# Patient Record
Sex: FEMALE | Race: WHITE | Employment: FULL TIME | URBAN - METROPOLITAN AREA
[De-identification: names, ages, dates, MRNs, and addresses within clinical notes are randomized per-mention and may not be internally consistent; named-entity substitution may affect disease eponyms.]

---

## 2018-10-27 LAB — LDL-C, EXTERNAL: 82

## 2019-06-21 ENCOUNTER — OFFICE VISIT (OUTPATIENT)
Dept: CARDIOLOGY CLINIC | Age: 64
End: 2019-06-21

## 2019-06-21 VITALS
WEIGHT: 134 LBS | DIASTOLIC BLOOD PRESSURE: 75 MMHG | HEART RATE: 87 BPM | HEIGHT: 64 IN | SYSTOLIC BLOOD PRESSURE: 140 MMHG | BODY MASS INDEX: 22.88 KG/M2

## 2019-06-21 DIAGNOSIS — R10.9 ABDOMINAL PAIN, UNSPECIFIED ABDOMINAL LOCATION: ICD-10-CM

## 2019-06-21 DIAGNOSIS — K92.1 HEMATOCHEZIA: ICD-10-CM

## 2019-06-21 DIAGNOSIS — I10 HTN (HYPERTENSION), BENIGN: ICD-10-CM

## 2019-06-21 DIAGNOSIS — E78.5 HYPERLIPIDEMIA, UNSPECIFIED HYPERLIPIDEMIA TYPE: ICD-10-CM

## 2019-06-21 DIAGNOSIS — I45.10 RBBB: Primary | ICD-10-CM

## 2019-06-21 DIAGNOSIS — Z82.49 FAMILY HISTORY OF CORONARY ARTERIOSCLEROSIS: ICD-10-CM

## 2019-06-21 RX ORDER — LISINOPRIL 40 MG/1
40 TABLET ORAL DAILY
COMMUNITY

## 2019-06-21 RX ORDER — HYDROCHLOROTHIAZIDE 25 MG/1
25 TABLET ORAL DAILY
COMMUNITY

## 2019-06-21 RX ORDER — SIMVASTATIN 20 MG/1
TABLET, FILM COATED ORAL
COMMUNITY
End: 2022-01-25

## 2019-06-21 NOTE — PROGRESS NOTES
HISTORY OF PRESENT ILLNESS  Ad Lozano is a 66 y.o. female. Patient is seen today for new patient evaluation. She had episode where she had profuse sudden sweating and felt like she had to go to the bathroom. She went to the bathroom but did not have any bowel movements. Slowly then episode resolved. No episode of syncope. No dizziness. Denies any chest pain shortness of breath or orthopnea or PND. She also had subsequently blood in the stool that was thought to be related to a blood vessel that was burst.  Had seen GI for that evaluation. Currently asymptomatic. She had abdominal cramps that is slowly resolved. She has strong family history of coronary disease with multiple member having atherosclerosis and MI at young age in 45s. She has multiple risk factor including hypertension hyperlipidemia and smoking      Review of Systems   Constitutional: Negative for chills and fever. HENT: Negative for nosebleeds. Eyes: Negative for blurred vision and double vision. Respiratory: Negative for cough, hemoptysis, sputum production, shortness of breath and wheezing. Cardiovascular: Negative for chest pain, palpitations, orthopnea, claudication, leg swelling and PND. Gastrointestinal: Positive for blood in stool. Negative for abdominal pain, heartburn, nausea and vomiting. Musculoskeletal: Negative for myalgias. Skin: Negative for rash. Neurological: Negative for dizziness, weakness and headaches. Endo/Heme/Allergies: Does not bruise/bleed easily.      Family History   Problem Relation Age of Onset    Heart Attack Father 61    Heart Surgery Brother     Coronary Artery Disease Brother 50    Heart Attack Mother        Past Medical History:   Diagnosis Date    Essential hypertension     Hyperlipidemia        Past Surgical History:   Procedure Laterality Date    HX PARTIAL HYSTERECTOMY         Social History     Tobacco Use    Smoking status: Current Every Day Smoker     Packs/day: 1.00     Types: Cigarettes    Smokeless tobacco: Never Used   Substance Use Topics    Alcohol use: Yes       Not on File    Prior to Admission medications    Medication Sig Start Date End Date Taking? Authorizing Provider   simvastatin (ZOCOR) 20 mg tablet Take  by mouth nightly. Yes Provider, Historical   lisinopril (PRINIVIL, ZESTRIL) 40 mg tablet Take 40 mg by mouth daily. Yes Provider, Historical   hydroCHLOROthiazide (HYDRODIURIL) 25 mg tablet Take 25 mg by mouth daily. Yes Provider, Historical         Visit Vitals  /75   Pulse 87   Ht 5' 4\" (1.626 m)   Wt 60.8 kg (134 lb)   BMI 23.00 kg/m²         Physical Exam   Constitutional: She is oriented to person, place, and time. She appears well-developed and well-nourished. HENT:   Head: Normocephalic and atraumatic. Eyes: Conjunctivae are normal.   Neck: Neck supple. No JVD present. No tracheal deviation present. No thyromegaly present. Cardiovascular: Normal rate and regular rhythm. PMI is not displaced. Exam reveals no gallop, no S3 and no decreased pulses. No murmur heard. Pulmonary/Chest: No respiratory distress. She has no wheezes. She has no rales. She exhibits no tenderness. Abdominal: Soft. There is no tenderness. Musculoskeletal: She exhibits no edema. Neurological: She is alert and oriented to person, place, and time. Skin: Skin is warm. Psychiatric: She has a normal mood and affect. Ms. Shabbir Anderson has a reminder for a \"due or due soon\" health maintenance. I have asked that she contact her primary care provider for follow-up on this health maintenance. No flowsheet data found. Assessment         ICD-10-CM ICD-9-CM    1. RBBB I45.10 426.4 ECHO ADULT COMPLETE      CT HEART W/O CONT WITH CALCIUM      NUCLEAR CARDIAC STRESS TEST    New onset. Unclear etiology rule out atherosclerotic disease in view of strong multiple risk factors   2. Hematochezia K92.1 578.1     Resolved. Followed by GI   3.  Abdominal pain, unspecified abdominal location R10.9 789.00     Improved. Likely noncardiac   4. HTN (hypertension), benign I10 401.1 ECHO ADULT COMPLETE      CT HEART W/O CONT WITH CALCIUM      NUCLEAR CARDIAC STRESS TEST    Stable continue treatment   5. Hyperlipidemia, unspecified hyperlipidemia type E78.5 272.4     Continue statin lab with PCP   6. Family history of coronary arteriosclerosis Z82.49 V17.3 ECHO ADULT COMPLETE      CT HEART W/O CONT WITH CALCIUM      NUCLEAR CARDIAC STRESS TEST    Multiple family members with coronary disease and young age   6/2019  Evaluation for right bundle branch block and symptoms that could be angina equivalent. New right bundle branch block. Will proceed with cardiac work-up including calcium score evaluation in view of strong family history    There are no discontinued medications. Orders Placed This Encounter    CT HEART W/O CONT WITH CALCIUM     Standing Status:   Future     Standing Expiration Date:   7/20/2020     Order Specific Question:   Is Patient Allergic to Contrast Dye? Answer:   No       Follow-up and Dispositions    · Return for F/u after tests.

## 2019-06-21 NOTE — PATIENT INSTRUCTIONS
i'mma Activation Thank you for requesting access to i'mma. Please follow the instructions below to securely access and download your online medical record. i'mma allows you to send messages to your doctor, view your test results, renew your prescriptions, schedule appointments, and more. How Do I Sign Up? 1. In your internet browser, go to [email or web address]. 2. Click on the First Time User? Click Here link in the Sign In box. You will see the New Member Sign Up page. 3. Enter your i'mma Access Code exactly as it appears below. You will not need to use this code after youve completed the sign-up process. If you do not sign up before the expiration date, you must request a new code. i'mma Access Code: [email or web address] Expires: 2019 12:52 PM (This is the date your i'mma access code will ) 4. Enter the last four digits of your Social Security Number (xxxx) and Date of Birth (mm/dd/yyyy) as indicated and click Submit. You will be taken to the next sign-up page. 5. Create a i'mma ID. This will be your i'mma login ID and cannot be changed, so think of one that is secure and easy to remember. 6. Create a i'mma password. You can change your password at any time. 7. Enter your Password Reset Question and Answer. This can be used at a later time if you forget your password. 8. Enter your e-mail address. You will receive e-mail notification when new information is available in [address] 9. Click Sign Up. You can now view and download portions of your medical record. 10. Click the Download Summary menu link to download a portable copy of your medical information. Additional Information If you have questions, please visit the Frequently Asked Questions section of the i'mma website at [email or web address] Remember, i'mma is NOT to be used for urgent needs. For medical emergencies, dial 911. Patient is scheduled to have a calcium scoring test at [facility] on 7/1/19 @ 1:30.

## 2019-07-01 ENCOUNTER — HOSPITAL ENCOUNTER (OUTPATIENT)
Dept: CT IMAGING | Age: 64
Discharge: HOME | End: 2019-07-01
Attending: INTERNAL MEDICINE
Payer: SELF-PAY

## 2019-07-01 DIAGNOSIS — I10 HTN (HYPERTENSION), BENIGN: ICD-10-CM

## 2019-07-01 DIAGNOSIS — Z82.49 FAMILY HISTORY OF CORONARY ARTERIOSCLEROSIS: ICD-10-CM

## 2019-07-01 DIAGNOSIS — I45.10 RBBB: ICD-10-CM

## 2019-07-01 PROCEDURE — 75571 CT HRT W/O DYE W/CA TEST: CPT

## 2019-07-30 ENCOUNTER — OFFICE VISIT (OUTPATIENT)
Dept: CARDIOLOGY CLINIC | Age: 64
End: 2019-07-30

## 2019-07-30 VITALS
HEART RATE: 71 BPM | BODY MASS INDEX: 23.39 KG/M2 | DIASTOLIC BLOOD PRESSURE: 65 MMHG | WEIGHT: 137 LBS | HEIGHT: 64 IN | SYSTOLIC BLOOD PRESSURE: 129 MMHG

## 2019-07-30 DIAGNOSIS — Z82.49 FAMILY HISTORY OF CORONARY ARTERIOSCLEROSIS: ICD-10-CM

## 2019-07-30 DIAGNOSIS — I10 HTN (HYPERTENSION), BENIGN: ICD-10-CM

## 2019-07-30 DIAGNOSIS — I25.84 CORONARY ARTERY CALCIFICATION: Primary | ICD-10-CM

## 2019-07-30 DIAGNOSIS — I25.10 CORONARY ARTERY CALCIFICATION: Primary | ICD-10-CM

## 2019-07-30 DIAGNOSIS — I45.10 RBBB: ICD-10-CM

## 2019-07-30 RX ORDER — ASPIRIN 81 MG/1
TABLET ORAL DAILY
COMMUNITY

## 2019-07-30 NOTE — PATIENT INSTRUCTIONS
AltspaceVR Activation    Thank you for requesting access to AltspaceVR. Please follow the instructions below to securely access and download your online medical record. AltspaceVR allows you to send messages to your doctor, view your test results, renew your prescriptions, schedule appointments, and more. How Do I Sign Up? 1. In your internet browser, go to [email or web address]. 2. Click on the First Time User? Click Here link in the Sign In box. You will see the New Member Sign Up page. 3. Enter your AltspaceVR Access Code exactly as it appears below. You will not need to use this code after youve completed the sign-up process. If you do not sign up before the expiration date, you must request a new code. AltspaceVR Access Code: [email or web address]  Expires: 2019 12:52 PM (This is the date your AltspaceVR access code will )    4. Enter the last four digits of your Social Security Number (xxxx) and Date of Birth (mm/dd/yyyy) as indicated and click Submit. You will be taken to the next sign-up page. 5. Create a AltspaceVR ID. This will be your AltspaceVR login ID and cannot be changed, so think of one that is secure and easy to remember. 6. Create a AltspaceVR password. You can change your password at any time. 7. Enter your Password Reset Question and Answer. This can be used at a later time if you forget your password. 8. Enter your e-mail address. You will receive e-mail notification when new information is available in [address] 9. Click Sign Up. You can now view and download portions of your medical record. 10. Click the Download Summary menu link to download a portable copy of your medical information. Additional Information    If you have questions, please visit the Frequently Asked Questions section of the AltspaceVR website at [email or web address] Remember, AltspaceVR is NOT to be used for urgent needs. For medical emergencies, dial 911.

## 2019-07-30 NOTE — PROGRESS NOTES
HISTORY OF PRESENT ILLNESS  Ruddy Quevedo is a 66 y.o. female. Patient is seen today for new patient evaluation. She had episode where she had profuse sudden sweating and felt like she had to go to the bathroom. She went to the bathroom but did not have any bowel movements. Slowly then episode resolved. No episode of syncope. No dizziness. Denies any chest pain shortness of breath or orthopnea or PND. She also had subsequently blood in the stool that was thought to be related to a blood vessel that was burst.  Had seen GI for that evaluation. Currently asymptomatic. She had abdominal cramps that is slowly resolved. She has strong family history of coronary disease with multiple member having atherosclerosis and MI at young age in 45s. She has multiple risk factor including hypertension hyperlipidemia and smoking with  Patient is here for follow up of diagnostic tests. Results will be discussed. Review of Systems   Constitutional: Negative for chills and fever. HENT: Negative for nosebleeds. Eyes: Negative for blurred vision and double vision. Respiratory: Negative for cough, hemoptysis, sputum production, shortness of breath and wheezing. Cardiovascular: Negative for chest pain, palpitations, orthopnea, claudication, leg swelling and PND. Gastrointestinal: Negative for abdominal pain, blood in stool, heartburn, nausea and vomiting. Musculoskeletal: Negative for myalgias. Skin: Negative for rash. Neurological: Negative for dizziness, weakness and headaches. Endo/Heme/Allergies: Does not bruise/bleed easily.      Family History   Problem Relation Age of Onset    Heart Attack Father 61    Heart Surgery Brother     Coronary Artery Disease Brother 50    Heart Attack Mother        Past Medical History:   Diagnosis Date    Essential hypertension     Hyperlipidemia        Past Surgical History:   Procedure Laterality Date    HX PARTIAL HYSTERECTOMY         Social History Tobacco Use    Smoking status: Current Every Day Smoker     Packs/day: 1.00     Types: Cigarettes    Smokeless tobacco: Never Used   Substance Use Topics    Alcohol use: Yes       Not on File    Prior to Admission medications    Medication Sig Start Date End Date Taking? Authorizing Provider   aspirin delayed-release 81 mg tablet Take  by mouth daily. Yes Provider, Historical   simvastatin (ZOCOR) 20 mg tablet Take  by mouth nightly. Yes Provider, Historical   lisinopril (PRINIVIL, ZESTRIL) 40 mg tablet Take 40 mg by mouth daily. Yes Provider, Historical   hydroCHLOROthiazide (HYDRODIURIL) 25 mg tablet Take 25 mg by mouth daily. Yes Provider, Historical         Visit Vitals  /65   Pulse 71   Ht 5' 4\" (1.626 m)   Wt 62.1 kg (137 lb)   BMI 23.52 kg/m²         Physical Exam   Constitutional: She is oriented to person, place, and time. She appears well-developed and well-nourished. HENT:   Head: Normocephalic and atraumatic. Eyes: Conjunctivae are normal.   Neck: Neck supple. No JVD present. No tracheal deviation present. No thyromegaly present. Cardiovascular: Normal rate and regular rhythm. PMI is not displaced. Exam reveals no gallop, no S3 and no decreased pulses. No murmur heard. Pulmonary/Chest: No respiratory distress. She has no wheezes. She has no rales. She exhibits no tenderness. Abdominal: Soft. There is no tenderness. Musculoskeletal: She exhibits no edema. Neurological: She is alert and oriented to person, place, and time. Skin: Skin is warm. Psychiatric: She has a normal mood and affect. Ms. Dimple Persaud has a reminder for a \"due or due soon\" health maintenance. I have asked that she contact her primary care provider for follow-up on this health maintenance. Procedure Conclusion 6/2019    Nuclear Stress Test     Negative myocardial perfusion imaging. Myocardial perfusion imaging supports a low risk stress test.   There is no prior study available for comparison. Interpretation Summary 6/2019       · Left Ventricle: Estimated left ventricular ejection fraction is 66 - 70%. No regional wall motion abnormality noted. Mild (grade 1) left ventricular diastolic dysfunction. · Right Ventricle: Normal right ventricular size and function. · No hemodynamically significant valvular pathology. CARDIOLOGY REPORT:       The patient underwent a limited noncontrast CT scan of the chest with cardiac  gating to evaluate for coronary arterial calcification. Using the Agatston  method the coronary calcium score was calculated. There is moderate coronary  calcium present in lad and rca. Coronary calcium score calculated at   329,lad-110,rca-219.     Impression:  Coronary calcium score 329. Assessment         ICD-10-CM ICD-9-CM    1. Coronary artery calcification I25.10 414.00     I25.84 414.4     Coronary calcification. Moderate. We will continue preventive therapy with aspirin and statin. Negative stress test   2. RBBB I45.10 426.4     Normal ejection fraction negative stress test monitor clinically   3. HTN (hypertension), benign I10 401.1     Stable   4. Family history of coronary arteriosclerosis Z82.49 V17.3     Continue risk factor modification   6/2019  Evaluation for right bundle branch block and symptoms that could be angina equivalent. New right bundle branch block. Will proceed with cardiac work-up including calcium score evaluation in view of strong family history  7/2019  Coronary calcification with negative stress test medical management. There are no discontinued medications. No orders of the defined types were placed in this encounter. Follow-up and Dispositions    · Return in about 6 months (around 1/30/2020).

## 2020-01-16 ENCOUNTER — OFFICE VISIT (OUTPATIENT)
Dept: CARDIOLOGY CLINIC | Age: 65
End: 2020-01-16

## 2020-01-16 VITALS
DIASTOLIC BLOOD PRESSURE: 77 MMHG | BODY MASS INDEX: 24.24 KG/M2 | SYSTOLIC BLOOD PRESSURE: 141 MMHG | HEART RATE: 94 BPM | HEIGHT: 64 IN | WEIGHT: 142 LBS

## 2020-01-16 DIAGNOSIS — I25.84 CORONARY ARTERY CALCIFICATION: Primary | ICD-10-CM

## 2020-01-16 DIAGNOSIS — E78.5 HYPERLIPIDEMIA, UNSPECIFIED HYPERLIPIDEMIA TYPE: ICD-10-CM

## 2020-01-16 DIAGNOSIS — I45.10 RBBB: ICD-10-CM

## 2020-01-16 DIAGNOSIS — I25.10 CORONARY ARTERY CALCIFICATION: Primary | ICD-10-CM

## 2020-01-16 DIAGNOSIS — I10 HTN (HYPERTENSION), BENIGN: ICD-10-CM

## 2020-01-16 RX ORDER — MULTIVIT WITH MINERALS/HERBS
1 TABLET ORAL DAILY
COMMUNITY
End: 2020-07-13

## 2020-01-16 RX ORDER — ASCORBIC ACID 250 MG
TABLET ORAL
COMMUNITY

## 2020-01-16 NOTE — PROGRESS NOTES
1. Have you been to the ER, urgent care clinic since your last visit? Hospitalized since your last visit?     no    2. Have you seen or consulted any other health care providers outside of the [address] [place] [address] since your last visit? Include any pap smears or colon screening. Yes Where: pcp     3. Since your last visit, have you had any of the following symptoms? chest pains.

## 2020-01-16 NOTE — PATIENT INSTRUCTIONS
Hitch Activation    Thank you for requesting access to Hitch. Please follow the instructions below to securely access and download your online medical record. Hitch allows you to send messages to your doctor, view your test results, renew your prescriptions, schedule appointments, and more. How Do I Sign Up? 1. In your internet browser, go to [email or web address]. 2. Click on the First Time User? Click Here link in the Sign In box. You will see the New Member Sign Up page. 3. Enter your Hitch Access Code exactly as it appears below. You will not need to use this code after youve completed the sign-up process. If you do not sign up before the expiration date, you must request a new code. Hitch Access Code: [email or web address]  Expires: 3/1/2020  8:52 AM (This is the date your Hitch access code will )    4. Enter the last four digits of your Social Security Number (xxxx) and Date of Birth (mm/dd/yyyy) as indicated and click Submit. You will be taken to the next sign-up page. 5. Create a Hitch ID. This will be your Hitch login ID and cannot be changed, so think of one that is secure and easy to remember. 6. Create a Hitch password. You can change your password at any time. 7. Enter your Password Reset Question and Answer. This can be used at a later time if you forget your password. 8. Enter your e-mail address. You will receive e-mail notification when new information is available in [address] 9. Click Sign Up. You can now view and download portions of your medical record. 10. Click the Download Summary menu link to download a portable copy of your medical information. Additional Information    If you have questions, please visit the Frequently Asked Questions section of the Hitch website at [email or web address] Remember, Hitch is NOT to be used for urgent needs. For medical emergencies, dial 911.

## 2020-01-16 NOTE — PROGRESS NOTES
HISTORY OF PRESENT ILLNESS  Tabitha Luque is a 60 y.o. female. Chest tightness6/ 2019 patient is seen today for new patient evaluation. She had episode where she had profuse sudden sweating and felt like she had to go to the bathroom. She went to the bathroom but did not have any bowel movements. Slowly then episode resolved. No episode of syncope. No dizziness. Denies any chest pain shortness of breath or orthopnea or PND. She also had subsequently blood in the stool that was thought to be related to a blood vessel that was burst.  Had seen GI for that evaluation. Currently asymptomatic. She had abdominal cramps that is slowly resolved. She has strong family history of coronary disease with multiple member having atherosclerosis and MI at young age in 45s. She has multiple risk factor including hypertension hyperlipidemia and smoking with  1/2020  Patient is here for follow-up of coronary calcification, hypertension and hyperlipidemia. On follow up patient denies any sob, palpitation or other significant symptoms. She has occasional chest tightness when doing strenuous activity      Review of Systems   Constitutional: Negative for chills and fever. HENT: Negative for nosebleeds. Eyes: Negative for blurred vision and double vision. Respiratory: Negative for cough, hemoptysis, sputum production, shortness of breath and wheezing. Cardiovascular: Negative for chest pain, palpitations, orthopnea, claudication, leg swelling and PND. Gastrointestinal: Negative for abdominal pain, blood in stool, heartburn, nausea and vomiting. Musculoskeletal: Negative for myalgias. Skin: Negative for rash. Neurological: Negative for dizziness, weakness and headaches. Endo/Heme/Allergies: Does not bruise/bleed easily.      Family History   Problem Relation Age of Onset    Heart Attack Father 61    Heart Surgery Brother     Coronary Artery Disease Brother 50    Heart Attack Mother        Past Medical History:   Diagnosis Date    Essential hypertension     Hyperlipidemia        Past Surgical History:   Procedure Laterality Date    HX PARTIAL HYSTERECTOMY         Social History     Tobacco Use    Smoking status: Current Every Day Smoker     Packs/day: 1.00     Types: Cigarettes    Smokeless tobacco: Never Used   Substance Use Topics    Alcohol use: Yes       No Known Allergies    Prior to Admission medications    Medication Sig Start Date End Date Taking? Authorizing Provider   b complex vitamins (VITAMINS B COMPLEX) tablet Take 1 Tab by mouth daily. Yes Provider, Historical   ascorbic acid, vitamin C, (VITAMIN C) 250 mg tablet Take  by mouth. Yes Provider, Historical   aspirin delayed-release 81 mg tablet Take  by mouth daily. Yes Provider, Historical   simvastatin (ZOCOR) 20 mg tablet Take  by mouth nightly. Yes Provider, Historical   lisinopril (PRINIVIL, ZESTRIL) 40 mg tablet Take 40 mg by mouth daily. Yes Provider, Historical   hydroCHLOROthiazide (HYDRODIURIL) 25 mg tablet Take 25 mg by mouth daily. Yes Provider, Historical         Visit Vitals  /77   Pulse 94   Ht 5' 4\" (1.626 m)   Wt 64.4 kg (142 lb)   BMI 24.37 kg/m²         Physical Exam   Constitutional: She is oriented to person, place, and time. She appears well-developed and well-nourished. HENT:   Head: Normocephalic and atraumatic. Eyes: Conjunctivae are normal.   Neck: Neck supple. No JVD present. No tracheal deviation present. No thyromegaly present. Cardiovascular: Normal rate and regular rhythm. PMI is not displaced. Exam reveals no gallop, no S3 and no decreased pulses. No murmur heard. Pulmonary/Chest: No respiratory distress. She has no wheezes. She has no rales. She exhibits no tenderness. Abdominal: Soft. There is no tenderness. Musculoskeletal:         General: No edema. Neurological: She is alert and oriented to person, place, and time. Skin: Skin is warm.    Psychiatric: She has a normal mood and affect. Ms. Lincoln Alanis has a reminder for a \"due or due soon\" health maintenance. I have asked that she contact her primary care provider for follow-up on this health maintenance. Procedure Conclusion 6/2019    Nuclear Stress Test     Negative myocardial perfusion imaging. Myocardial perfusion imaging supports a low risk stress test.   There is no prior study available for comparison. Interpretation Summary 6/2019       · Left Ventricle: Estimated left ventricular ejection fraction is 66 - 70%. No regional wall motion abnormality noted. Mild (grade 1) left ventricular diastolic dysfunction. · Right Ventricle: Normal right ventricular size and function. · No hemodynamically significant valvular pathology. CARDIOLOGY REPORT:       The patient underwent a limited noncontrast CT scan of the chest with cardiac  gating to evaluate for coronary arterial calcification. Using the Agatston  method the coronary calcium score was calculated. There is moderate coronary  calcium present in lad and rca. Coronary calcium score calculated at   329,lad-110,rca-219.     Impression:  Coronary calcium score 329. I Have personally reviewed recent relevant labs available and discussed with patient  11/2019-lipids  Assessment         ICD-10-CM ICD-9-CM    1. Coronary artery calcification I25.10 414.00     I25.84 414.4     Stable continue current medical management. Aspirin and statin   2. RBBB I45.10 426.4     Mostly asymptomatic monitor   3. HTN (hypertension), benign I10 401.1     Stable monitor   4. Hyperlipidemia, unspecified hyperlipidemia type E78.5 272.4     Continue treatment. Labs reviewed from 11/2019 LDL in 70s   6/2019  Evaluation for right bundle branch block and symptoms that could be angina equivalent. New right bundle branch block.   Will proceed with cardiac work-up including calcium score evaluation in view of strong family history  7/2019  Coronary calcification with negative stress test medical management. There are no discontinued medications. No orders of the defined types were placed in this encounter. Follow-up and Dispositions    · Return in about 6 months (around 7/16/2020).

## 2020-07-13 ENCOUNTER — OFFICE VISIT (OUTPATIENT)
Dept: CARDIOLOGY CLINIC | Age: 65
End: 2020-07-13

## 2020-07-13 VITALS
HEART RATE: 90 BPM | WEIGHT: 146 LBS | SYSTOLIC BLOOD PRESSURE: 129 MMHG | DIASTOLIC BLOOD PRESSURE: 74 MMHG | HEIGHT: 64 IN | TEMPERATURE: 97.7 F | BODY MASS INDEX: 24.92 KG/M2

## 2020-07-13 DIAGNOSIS — I25.84 CORONARY ARTERY CALCIFICATION: Primary | ICD-10-CM

## 2020-07-13 DIAGNOSIS — E78.5 HYPERLIPIDEMIA, UNSPECIFIED HYPERLIPIDEMIA TYPE: ICD-10-CM

## 2020-07-13 DIAGNOSIS — I10 HTN (HYPERTENSION), BENIGN: ICD-10-CM

## 2020-07-13 DIAGNOSIS — I45.10 RBBB: ICD-10-CM

## 2020-07-13 DIAGNOSIS — I25.10 CORONARY ARTERY CALCIFICATION: Primary | ICD-10-CM

## 2020-07-13 NOTE — PATIENT INSTRUCTIONS
[employer] Activation Thank you for requesting access to [employer]. Please follow the instructions below to securely access and download your online medical record. [employer] allows you to send messages to your doctor, view your test results, renew your prescriptions, schedule appointments, and more. How Do I Sign Up? 1. In your internet browser, go to [email or web address]. 2. Click on the First Time User? Click Here link in the Sign In box. You will see the New Member Sign Up page. 3. Enter your [employer] Access Code exactly as it appears below. You will not need to use this code after youve completed the sign-up process. If you do not sign up before the expiration date, you must request a new code. [employer] Access Code: [email or web address] Expires: 2020  9:18 AM (This is the date your [employer] access code will ) 4. Enter the last four digits of your Social Security Number (xxxx) and Date of Birth (mm/dd/yyyy) as indicated and click Submit. You will be taken to the next sign-up page. 5. Create a [employer] ID. This will be your [employer] login ID and cannot be changed, so think of one that is secure and easy to remember. 6. Create a [employer] password. You can change your password at any time. 7. Enter your Password Reset Question and Answer. This can be used at a later time if you forget your password. 8. Enter your e-mail address. You will receive e-mail notification when new information is available in [address] 9. Click Sign Up. You can now view and download portions of your medical record. 10. Click the Download Summary menu link to download a portable copy of your medical information. Additional Information If you have questions, please visit the Frequently Asked Questions section of the [employer] website at [email or web address] Remember, [employer] is NOT to be used for urgent needs. For medical emergencies, dial 911.

## 2020-07-13 NOTE — PROGRESS NOTES
1. Have you been to the ER, urgent care clinic since your last visit? Hospitalized since your last visit?no    2. Have you seen or consulted any other health care providers outside of the [address] [place] [address] since your last visit? Include any pap smears or colon screening.  no

## 2020-07-13 NOTE — PROGRESS NOTES
HISTORY OF PRESENT ILLNESS  Kaylynn Downs is a 60 y.o. female. 6/ 2019  Chest tightness patient is seen today for new patient evaluation. She had episode where she had profuse sudden sweating and felt like she had to go to the bathroom. She went to the bathroom but did not have any bowel movements. Slowly then episode resolved. No episode of syncope. No dizziness. Denies any chest pain shortness of breath or orthopnea or PND. She also had subsequently blood in the stool that was thought to be related to a blood vessel that was burst.  Had seen GI for that evaluation. Currently asymptomatic. She had abdominal cramps that is slowly resolved. She has strong family history of coronary disease with multiple member having atherosclerosis and MI at young age in 45s. She has multiple risk factor including hypertension hyperlipidemia and smoking with  1/2020  Patient is here for follow-up of coronary calcification, hypertension and hyperlipidemia. On follow up patient denies any sob, palpitation or other significant symptoms. She has occasional chest tightness when doing strenuous activity      Review of Systems   Constitutional: Negative for chills and fever. HENT: Negative for nosebleeds. Eyes: Negative for blurred vision and double vision. Respiratory: Negative for cough, hemoptysis, sputum production, shortness of breath and wheezing. Cardiovascular: Negative for chest pain, palpitations, orthopnea, claudication, leg swelling and PND. Gastrointestinal: Negative for abdominal pain, blood in stool, heartburn, nausea and vomiting. Musculoskeletal: Negative for myalgias. Skin: Negative for rash. Neurological: Negative for dizziness, weakness and headaches. Endo/Heme/Allergies: Does not bruise/bleed easily.      Family History   Problem Relation Age of Onset    Heart Attack Father 61    Heart Surgery Brother     Coronary Artery Disease Brother 50    Heart Attack Mother        Past Medical History:   Diagnosis Date    Essential hypertension     Hyperlipidemia        Past Surgical History:   Procedure Laterality Date    HX PARTIAL HYSTERECTOMY         Social History     Tobacco Use    Smoking status: Current Every Day Smoker     Packs/day: 1.00     Types: Cigarettes    Smokeless tobacco: Never Used    Tobacco comment: pack a day   Substance Use Topics    Alcohol use: Yes     Comment: 7  beers a week       No Known Allergies    Prior to Admission medications    Medication Sig Start Date End Date Taking? Authorizing Provider   ascorbic acid, vitamin C, (VITAMIN C) 250 mg tablet Take  by mouth. Yes Provider, Historical   aspirin delayed-release 81 mg tablet Take  by mouth daily. Yes Provider, Historical   simvastatin (ZOCOR) 20 mg tablet Take  by mouth nightly. Yes Provider, Historical   lisinopril (PRINIVIL, ZESTRIL) 40 mg tablet Take 40 mg by mouth daily. Yes Provider, Historical   hydroCHLOROthiazide (HYDRODIURIL) 25 mg tablet Take 25 mg by mouth daily. Yes Provider, Historical         Visit Vitals  /74   Pulse 90   Temp 97.7 °F (36.5 °C)   Ht 5' 4\" (1.626 m)   Wt 66.2 kg (146 lb)   BMI 25.06 kg/m²         Physical Exam   Constitutional: She is oriented to person, place, and time. She appears well-developed and well-nourished. HENT:   Head: Normocephalic and atraumatic. Eyes: Conjunctivae are normal.   Neck: Neck supple. No JVD present. No tracheal deviation present. No thyromegaly present. Cardiovascular: Normal rate and regular rhythm. PMI is not displaced. Exam reveals no gallop, no S3 and no decreased pulses. No murmur heard. Pulmonary/Chest: No respiratory distress. She has no wheezes. She has no rales. She exhibits no tenderness. Abdominal: Soft. There is no abdominal tenderness. Musculoskeletal:         General: No edema. Neurological: She is alert and oriented to person, place, and time. Skin: Skin is warm.    Psychiatric: She has a normal mood and affect. Ms. Jensen Cruz has a reminder for a \"due or due soon\" health maintenance. I have asked that she contact her primary care provider for follow-up on this health maintenance. Procedure Conclusion 6/2019    Nuclear Stress Test     Negative myocardial perfusion imaging. Myocardial perfusion imaging supports a low risk stress test.   There is no prior study available for comparison. Interpretation Summary 6/2019       · Left Ventricle: Estimated left ventricular ejection fraction is 66 - 70%. No regional wall motion abnormality noted. Mild (grade 1) left ventricular diastolic dysfunction. · Right Ventricle: Normal right ventricular size and function. · No hemodynamically significant valvular pathology. CARDIOLOGY REPORT:       The patient underwent a limited noncontrast CT scan of the chest with cardiac  gating to evaluate for coronary arterial calcification. Using the Agatston  method the coronary calcium score was calculated. There is moderate coronary  calcium present in lad and rca. Coronary calcium score calculated at   329,lad-110,rca-219.     Impression:  Coronary calcium score 329. I Have personally reviewed recent relevant labs available and discussed with patient  11/2019lipids  Assessment         ICD-10-CM ICD-9-CM    1. Coronary artery calcification  I25.10 414.00     I25.84 414.4     Stable continue current medical management   2. RBBB  I45.10 426.4     Old monitor   3. HTN (hypertension), benign  I10 401.1     Stable continue treatment   4. Hyperlipidemia, unspecified hyperlipidemia type  E78.5 272.4     Continue statin last lab reviewed 11/2019.   6/2019  Evaluation for right bundle branch block and symptoms that could be angina equivalent. New right bundle branch block. Will proceed with cardiac work-up including calcium score evaluation in view of strong family history  7/2019  Coronary calcification with negative stress test medical management.   7/2020  Cardiac status stable. Continue statin and aspirin. Lab with PCP  Medications Discontinued During This Encounter   Medication Reason    b complex vitamins (VITAMINS B COMPLEX) tablet Not A Current Medication       No orders of the defined types were placed in this encounter. Follow-up and Dispositions    · Return in about 6 months (around 1/13/2021).

## 2021-01-15 ENCOUNTER — OFFICE VISIT (OUTPATIENT)
Dept: CARDIOLOGY CLINIC | Age: 66
End: 2021-01-15
Payer: COMMERCIAL

## 2021-01-15 VITALS
WEIGHT: 152 LBS | DIASTOLIC BLOOD PRESSURE: 77 MMHG | HEART RATE: 90 BPM | TEMPERATURE: 97.4 F | SYSTOLIC BLOOD PRESSURE: 157 MMHG | BODY MASS INDEX: 25.95 KG/M2 | HEIGHT: 64 IN

## 2021-01-15 DIAGNOSIS — I25.10 CORONARY ARTERY CALCIFICATION: Primary | ICD-10-CM

## 2021-01-15 DIAGNOSIS — I45.10 RBBB: ICD-10-CM

## 2021-01-15 DIAGNOSIS — I25.84 CORONARY ARTERY CALCIFICATION: Primary | ICD-10-CM

## 2021-01-15 DIAGNOSIS — I10 HTN (HYPERTENSION), BENIGN: ICD-10-CM

## 2021-01-15 DIAGNOSIS — E78.5 HYPERLIPIDEMIA, UNSPECIFIED HYPERLIPIDEMIA TYPE: ICD-10-CM

## 2021-01-15 PROCEDURE — 99214 OFFICE O/P EST MOD 30 MIN: CPT | Performed by: INTERNAL MEDICINE

## 2021-01-15 NOTE — PROGRESS NOTES
HISTORY OF PRESENT ILLNESS  Antony Greene is a 63 y.o. female. 6/ 2019  Chest tightness patient is seen today for new patient evaluation. She had episode where she had profuse sudden sweating and felt like she had to go to the bathroom. She went to the bathroom but did not have any bowel movements. Slowly then episode resolved. No episode of syncope. No dizziness. Denies any chest pain shortness of breath or orthopnea or PND. She also had subsequently blood in the stool that was thought to be related to a blood vessel that was burst.  Had seen GI for that evaluation. Currently asymptomatic. She had abdominal cramps that is slowly resolved. She has strong family history of coronary disease with multiple member having atherosclerosis and MI at young age in 45s. She has multiple risk factor including hypertension hyperlipidemia and smoking with  1/2020  Patient is here for follow-up of coronary calcification, hypertension and hyperlipidemia. On follow up patient denies any sob, palpitation or other significant symptoms. She has occasional chest tightness when doing strenuous activity  1/2021  Patient seen today for follow-up. Patient has occasional chest tightness lasting few seconds not related activity exertion. No shortness of breath or edema. Review of Systems   Constitutional: Negative for chills and fever. HENT: Negative for nosebleeds. Eyes: Negative for blurred vision and double vision. Respiratory: Negative for cough, hemoptysis, sputum production, shortness of breath and wheezing. Cardiovascular: Negative for chest pain, palpitations, orthopnea, claudication, leg swelling and PND. Gastrointestinal: Negative for abdominal pain, blood in stool, heartburn, nausea and vomiting. Musculoskeletal: Negative for myalgias. Skin: Negative for rash. Neurological: Negative for dizziness, weakness and headaches. Endo/Heme/Allergies: Does not bruise/bleed easily.      Family History   Problem Relation Age of Onset    Heart Attack Father 61    Heart Surgery Brother     Coronary Artery Disease Brother 50    Heart Attack Mother        Past Medical History:   Diagnosis Date    Essential hypertension     Hyperlipidemia        Past Surgical History:   Procedure Laterality Date    HX PARTIAL HYSTERECTOMY         Social History     Tobacco Use    Smoking status: Current Every Day Smoker     Packs/day: 1.00     Types: Cigarettes    Smokeless tobacco: Never Used    Tobacco comment: pack a day   Substance Use Topics    Alcohol use: Yes     Comment: 7  beers a week       No Known Allergies    Prior to Admission medications    Medication Sig Start Date End Date Taking? Authorizing Provider   aspirin delayed-release 81 mg tablet Take  by mouth daily. Yes Provider, Historical   simvastatin (ZOCOR) 20 mg tablet Take  by mouth nightly. Yes Provider, Historical   lisinopril (PRINIVIL, ZESTRIL) 40 mg tablet Take 40 mg by mouth daily. Yes Provider, Historical   hydroCHLOROthiazide (HYDRODIURIL) 25 mg tablet Take 25 mg by mouth daily. Yes Provider, Historical   ascorbic acid, vitamin C, (VITAMIN C) 250 mg tablet Take  by mouth. Provider, Historical         Visit Vitals  BP (!) 157/77   Pulse 90   Temp 97.4 °F (36.3 °C) (Temporal)   Ht 5' 4\" (1.626 m)   Wt 68.9 kg (152 lb)   BMI 26.09 kg/m²         Physical Exam   Constitutional: She is oriented to person, place, and time. She appears well-developed and well-nourished. HENT:   Head: Normocephalic and atraumatic. Eyes: Conjunctivae are normal.   Neck: Neck supple. No JVD present. No tracheal deviation present. No thyromegaly present. Cardiovascular: Normal rate and regular rhythm. PMI is not displaced. Exam reveals no gallop, no S3 and no decreased pulses. No murmur heard. Pulmonary/Chest: No respiratory distress. She has no wheezes. She has no rales. She exhibits no tenderness. Abdominal: Soft.  There is no abdominal tenderness. Musculoskeletal:         General: No edema. Neurological: She is alert and oriented to person, place, and time. Skin: Skin is warm. Psychiatric: She has a normal mood and affect. Ms. Calderon [place] has a reminder for a \"due or due soon\" health maintenance. I have asked that she contact her primary care provider for follow-up on this health maintenance. Procedure Conclusion 6/2019    Nuclear Stress Test     Negative myocardial perfusion imaging. Myocardial perfusion imaging supports a low risk stress test.   There is no prior study available for comparison. Interpretation Summary 6/2019       · Left Ventricle: Estimated left ventricular ejection fraction is 66 - 70%. No regional wall motion abnormality noted. Mild (grade 1) left ventricular diastolic dysfunction. · Right Ventricle: Normal right ventricular size and function. · No hemodynamically significant valvular pathology. CARDIOLOGY REPORT:       The patient underwent a limited noncontrast CT scan of the chest with cardiac  gating to evaluate for coronary arterial calcification. Using the Agatston  method the coronary calcium score was calculated. There is moderate coronary  calcium present in lad and rca. Coronary calcium score calculated at   329,lad-110,rca-219.     Impression:  Coronary calcium score 329. I Have personally reviewed recent relevant labs available and discussed with patient  11/2019lipids  Assessment         ICD-10-CM ICD-9-CM    1. Coronary artery calcification  I25.10 414.00     I25.84 414.4     Stable continue treatment monitor asymptomatic   2. RBBB  I45.10 426.4     Old. Stable   3. HTN (hypertension), benign  I10 401.1     Blood pressure is elevated in office usually runs normal will monitor at home and decide on treatment adjustment   4. Hyperlipidemia, unspecified hyperlipidemia type  E78.5 272.4     Continue current statin.   Lab with PCP   6/2019  Evaluation for right bundle branch block and symptoms that could be angina equivalent. New right bundle branch block. Will proceed with cardiac work-up including calcium score evaluation in view of strong family history  7/2019  Coronary calcification with negative stress test medical management. 7/2020  Cardiac status stable. Continue statin and aspirin. Lab with PCP  1/2021  Cardiac status stable. Chest tightness stable. Continue medical management. Lab with PCP pending. Elevated blood pressure here will monitor at home and decide on treatment adjustment  There are no discontinued medications. No orders of the defined types were placed in this encounter. Follow-up and Dispositions    · Return in about 6 months (around 7/15/2021) for get labs from pcp.

## 2021-01-15 NOTE — PROGRESS NOTES
1. Have you been to the ER, urgent care clinic since your last visit? Hospitalized since your last visit?     no  2. Have you seen or consulted any other health care providers outside of the [address] [place] [address] since your last visit? Include any pap smears or colon screening. Yes Where:      3. Since your last visit, have you had any of the following symptoms? chest pains and swelling in legs/arms.

## 2021-07-06 ENCOUNTER — OFFICE VISIT (OUTPATIENT)
Dept: CARDIOLOGY CLINIC | Age: 66
End: 2021-07-06
Payer: COMMERCIAL

## 2021-07-06 VITALS
WEIGHT: 150 LBS | HEART RATE: 85 BPM | BODY MASS INDEX: 25.61 KG/M2 | DIASTOLIC BLOOD PRESSURE: 71 MMHG | SYSTOLIC BLOOD PRESSURE: 123 MMHG | HEIGHT: 64 IN

## 2021-07-06 DIAGNOSIS — I45.10 RBBB: ICD-10-CM

## 2021-07-06 DIAGNOSIS — I25.84 CORONARY ARTERY CALCIFICATION: Primary | ICD-10-CM

## 2021-07-06 DIAGNOSIS — E78.5 HYPERLIPIDEMIA, UNSPECIFIED HYPERLIPIDEMIA TYPE: ICD-10-CM

## 2021-07-06 DIAGNOSIS — I25.10 CORONARY ARTERY CALCIFICATION: Primary | ICD-10-CM

## 2021-07-06 DIAGNOSIS — Z82.49 FAMILY HISTORY OF CORONARY ARTERIOSCLEROSIS: ICD-10-CM

## 2021-07-06 DIAGNOSIS — I10 HTN (HYPERTENSION), BENIGN: ICD-10-CM

## 2021-07-06 PROCEDURE — 99214 OFFICE O/P EST MOD 30 MIN: CPT | Performed by: INTERNAL MEDICINE

## 2021-07-06 NOTE — PATIENT INSTRUCTIONS
Right Bundle Branch Block         Heart-Healthy Diet: Care Instructions  Your Care Instructions     A heart-healthy diet has lots of vegetables, fruits, nuts, beans, and whole grains, and is low in salt. It limits foods that are high in saturated fat, such as meats, cheeses, and fried foods. It may be hard to change your diet, but even small changes can lower your risk of heart attack and heart disease. Follow-up care is a key part of your treatment and safety. Be sure to make and go to all appointments, and call your doctor if you are having problems. It's also a good idea to know your test results and keep a list of the medicines you take. How can you care for yourself at home? Watch your portions  · Use food labels to learn what the recommended servings are for the foods you eat. · Eat only the number of calories you need to stay at a healthy weight. If you need to lose weight, eat fewer calories than your body burns (through exercise and other physical activity). Eat more fruits and vegetables  · Eat a variety of fruit and vegetables every day. Dark green, deep orange, red, or yellow fruits and vegetables are especially good for you. Examples include spinach, carrots, peaches, and berries. · Keep carrots, celery, and other veggies handy for snacks. Buy fruit that is in season and store it where you can see it so that you will be tempted to eat it. · Cook dishes that have a lot of veggies in them, such as stir-fries and soups. Limit saturated fat  · Read food labels, and try to avoid saturated fats. They increase your risk of heart disease. · Use olive or canola oil when you cook. · Bake, broil, grill, or steam foods instead of frying them. · Choose lean meats instead of high-fat meats such as hot dogs and sausages. Cut off all visible fat when you prepare meat. · Eat fish, skinless poultry, and meat alternatives such as soy products instead of high-fat meats.  Soy products, such as tofu, may be especially good for your heart. · Choose low-fat or fat-free milk and dairy products. Eat foods high in fiber  · Eat a variety of grain products every day. Include whole-grain foods that have lots of fiber and nutrients. Examples of whole-grain foods include oats, whole wheat bread, and brown rice. · Buy whole-grain breads and cereals, instead of white bread or pastries. Limit salt and sodium  · Limit how much salt and sodium you eat to help lower your blood pressure. · Taste food before you salt it. Add only a little salt when you think you need it. With time, your taste buds will adjust to less salt. · Eat fewer snack items, fast foods, and other high-salt, processed foods. Check food labels for the amount of sodium in packaged foods. · Choose low-sodium versions of canned goods (such as soups, vegetables, and beans). Limit sugar  · Limit drinks and foods with added sugar. These include candy, desserts, and soda pop. Limit alcohol  · Limit alcohol to no more than 2 drinks a day for men and 1 drink a day for women. Too much alcohol can cause health problems. When should you call for help? Watch closely for changes in your health, and be sure to contact your doctor if:    · You would like help planning heart-healthy meals. Where can you learn more? Go to http://www.smith.com/  Enter V137 in the search box to learn more about \"Heart-Healthy Diet: Care Instructions. \"  Current as of: December 17, 2020               Content Version: 12.8  © 2006-2021 [employer]. Care instructions adapted under license by [employer] (which disclaims liability or warranty for this information). If you have questions about a medical condition or this instruction, always ask your healthcare professional. [place] [address] any warranty or liability for your use of this information.

## 2021-07-06 NOTE — PROGRESS NOTES
HISTORY OF PRESENT ILLNESS  Manda Castrejon is a 63 y.o. female. 6/ 2019  Chest tightness patient is seen today for new patient evaluation. She had episode where she had profuse sudden sweating and felt like she had to go to the bathroom. She went to the bathroom but did not have any bowel movements. Slowly then episode resolved. No episode of syncope. No dizziness. Denies any chest pain shortness of breath or orthopnea or PND. She also had subsequently blood in the stool that was thought to be related to a blood vessel that was burst.  Had seen GI for that evaluation. Currently asymptomatic. She had abdominal cramps that is slowly resolved. She has strong family history of coronary disease with multiple member having atherosclerosis and MI at young age in 45s. She has multiple risk factor including hypertension hyperlipidemia and smoking with  1/2020  Patient is here for follow-up of coronary calcification, hypertension and hyperlipidemia. On follow up patient denies any sob, palpitation or other significant symptoms. She has occasional chest tightness when doing strenuous activity  1/2021  Patient seen today for follow-up. Patient has occasional chest tightness lasting few seconds not related activity exertion. No shortness of breath or edema. 7/2021  Patent seen for 6 month f/u for history of RBBB. She denies chest pain, SOB, palpitations or edema. Review of Systems   Constitutional: Negative for chills and fever. HENT: Negative for nosebleeds. Eyes: Negative for blurred vision and double vision. Respiratory: Negative for cough, hemoptysis, sputum production, shortness of breath and wheezing. Cardiovascular: Negative for chest pain, palpitations, orthopnea, claudication, leg swelling and PND. Gastrointestinal: Negative for abdominal pain, blood in stool, heartburn, nausea and vomiting. Musculoskeletal: Negative for myalgias. Skin: Negative for rash.    Neurological: Negative for dizziness, weakness and headaches. Endo/Heme/Allergies: Does not bruise/bleed easily. Family History   Problem Relation Age of Onset    Heart Attack Father 61    Heart Surgery Brother     Coronary Artery Disease Brother 50    Heart Attack Mother        Past Medical History:   Diagnosis Date    Essential hypertension     Hyperlipidemia        Past Surgical History:   Procedure Laterality Date    HX PARTIAL HYSTERECTOMY         Social History     Tobacco Use    Smoking status: Current Every Day Smoker     Packs/day: 1.00     Types: Cigarettes    Smokeless tobacco: Never Used    Tobacco comment: pack a day   Substance Use Topics    Alcohol use: Yes     Comment: 7  beers a week       No Known Allergies    Prior to Admission medications    Medication Sig Start Date End Date Taking? Authorizing Provider   ascorbic acid, vitamin C, (VITAMIN C) 250 mg tablet Take  by mouth. Yes Provider, Historical   aspirin delayed-release 81 mg tablet Take  by mouth daily. Yes Provider, Historical   simvastatin (ZOCOR) 20 mg tablet Take  by mouth nightly. Yes Provider, Historical   lisinopril (PRINIVIL, ZESTRIL) 40 mg tablet Take 40 mg by mouth daily. Yes Provider, Historical   hydroCHLOROthiazide (HYDRODIURIL) 25 mg tablet Take 25 mg by mouth daily. Yes Provider, Historical         Visit Vitals  /71   Pulse 85   Ht 5' 4\" (1.626 m)   Wt 68 kg (150 lb)   BMI 25.75 kg/m²         Physical Exam  Vitals and nursing note reviewed. Constitutional:       Appearance: She is well-developed. HENT:      Head: Normocephalic and atraumatic. Eyes:      Conjunctiva/sclera: Conjunctivae normal.   Neck:      Thyroid: No thyromegaly. Vascular: No JVD. Trachea: No tracheal deviation. Cardiovascular:      Rate and Rhythm: Normal rate and regular rhythm. Chest Wall: PMI is not displaced. Pulses: No decreased pulses. Heart sounds: No murmur heard. No gallop. No S3 sounds. Pulmonary:      Effort: No respiratory distress. Breath sounds: No wheezing or rales. Chest:      Chest wall: No tenderness. Abdominal:      Palpations: Abdomen is soft. Tenderness: There is no abdominal tenderness. Musculoskeletal:      Cervical back: Neck supple. Right lower leg: No edema. Left lower leg: No edema. Skin:     General: Skin is warm. Neurological:      Mental Status: She is alert and oriented to person, place, and time. Ms. Laura Robins has a reminder for a \"due or due soon\" health maintenance. I have asked that she contact her primary care provider for follow-up on this health maintenance. Procedure Conclusion 6/2019    Nuclear Stress Test     Negative myocardial perfusion imaging. Myocardial perfusion imaging supports a low risk stress test.   There is no prior study available for comparison. Interpretation Summary 6/2019       · Left Ventricle: Estimated left ventricular ejection fraction is 66 - 70%. No regional wall motion abnormality noted. Mild (grade 1) left ventricular diastolic dysfunction. · Right Ventricle: Normal right ventricular size and function. · No hemodynamically significant valvular pathology. CARDIOLOGY REPORT:       The patient underwent a limited noncontrast CT scan of the chest with cardiac  gating to evaluate for coronary arterial calcification. Using the Agatston  method the coronary calcium score was calculated. There is moderate coronary  calcium present in lad and rca. Coronary calcium score calculated at   329,lad-110,rca-219.     Impression:  Coronary calcium score 329. I Have personally reviewed recent relevant labs available and discussed with patient  11/2019lipids  Assessment         ICD-10-CM ICD-9-CM    1. Coronary artery calcification  I25.10 414.00     I25.84 414.4     Stable continue treatment monitor asymptomatic   2. RBBB  I45.10 426.4      Old. Stable       3.  HTN (hypertension), benign  I10 401.1 Blood pressure is controlled, continue current treatment   4. Hyperlipidemia, unspecified hyperlipidemia type  E78.5 272.4     Continue statin lab with PCP   5. Family history of coronary arteriosclerosis  Z82.49 V17.3     Stable continue treatment monitor asymptomatic   6/2019  Evaluation for right bundle branch block and symptoms that could be angina equivalent. New right bundle branch block. Will proceed with cardiac work-up including calcium score evaluation in view of strong family history  7/2019  Coronary calcification with negative stress test medical management. 7/2020  Cardiac status stable. Continue statin and aspirin. Lab with PCP  1/2021  Cardiac status stable. Chest tightness stable. Continue medical management. Lab with PCP pending. Elevated blood pressure here will monitor at home and decide on treatment adjustment  7/2021  Cardiac status is stable. Recent BMP CBC and lipids reviewed and discussed with patient blood pressure is controlled continue current medications. I have personally performed a face-to-face diagnostic evaluation on this patient. My findings are as follows. History and physical exam by me shows: Blood pressure controlled. Clinical exam unremarkable  I have personally reviewed all the diagnostic labs, available EKG imaging x-rays and other diagnostic data and incorporated them into my care I discussed. .  My clinical impression for patient's status stable. Mildly elevated triglyceride. Continue with low-carb diet and current treatment  Detailed discussion of patient's care plan was discussed. All appropriate treatment options discussed and incorporated. All medications as well as diagnostic testing evaluation that are required for the patient I reviewed and orders are placed under my supervision. Care plan discussed and updated after review. Juve Denis MD    There are no discontinued medications.     No orders of the defined types were placed in this encounter. Follow-up and Dispositions    · Return in about 6 months (around 1/6/2022), or if symptoms worsen or fail to improve, for Follow up with Dr. Yolanda Rutledge.

## 2021-07-06 NOTE — PROGRESS NOTES
1. Have you been to the ER, urgent care clinic since your last visit? Hospitalized since your last visit?     no  2. Have you seen or consulted any other health care providers outside of the [address] [place] [address] since your last visit? Include any pap smears or colon screening. No     3. Since your last visit, have you had any of the following symptoms?      swelling in legs/arms.

## 2022-01-25 ENCOUNTER — OFFICE VISIT (OUTPATIENT)
Dept: CARDIOLOGY CLINIC | Age: 67
End: 2022-01-25
Payer: COMMERCIAL

## 2022-01-25 VITALS
BODY MASS INDEX: 25.78 KG/M2 | DIASTOLIC BLOOD PRESSURE: 67 MMHG | OXYGEN SATURATION: 98 % | HEIGHT: 64 IN | HEART RATE: 88 BPM | SYSTOLIC BLOOD PRESSURE: 132 MMHG | WEIGHT: 151 LBS

## 2022-01-25 DIAGNOSIS — I25.10 CORONARY ARTERY CALCIFICATION: Primary | ICD-10-CM

## 2022-01-25 DIAGNOSIS — I25.84 CORONARY ARTERY CALCIFICATION: Primary | ICD-10-CM

## 2022-01-25 DIAGNOSIS — I10 HTN (HYPERTENSION), BENIGN: ICD-10-CM

## 2022-01-25 DIAGNOSIS — I45.10 RBBB: ICD-10-CM

## 2022-01-25 DIAGNOSIS — E78.5 HYPERLIPIDEMIA, UNSPECIFIED HYPERLIPIDEMIA TYPE: ICD-10-CM

## 2022-01-25 PROCEDURE — 99214 OFFICE O/P EST MOD 30 MIN: CPT | Performed by: INTERNAL MEDICINE

## 2022-01-25 RX ORDER — FLUTICASONE PROPIONATE 50 MCG
SPRAY, SUSPENSION (ML) NASAL
COMMUNITY
Start: 2021-10-25

## 2022-01-25 RX ORDER — SIMVASTATIN 40 MG/1
40 TABLET, FILM COATED ORAL
Qty: 90 TABLET | Refills: 3 | Status: SHIPPED | OUTPATIENT
Start: 2022-01-25

## 2022-01-25 NOTE — PROGRESS NOTES
1. Have you been to the ER, urgent care clinic since your last visit? Hospitalized since your last visit? No    2. Have you seen or consulted any other health care providers outside of the [address] [place] [address] since your last visit? Include any pap smears or colon screening.  No

## 2022-01-25 NOTE — PROGRESS NOTES
HISTORY OF PRESENT ILLNESS  Valdemar Carter is a 61 y.o. female. 6/ 2019  Chest tightness patient is seen today for new patient evaluation. She had episode where she had profuse sudden sweating and felt like she had to go to the bathroom. She went to the bathroom but did not have any bowel movements. Slowly then episode resolved. No episode of syncope. No dizziness. Denies any chest pain shortness of breath or orthopnea or PND. She also had subsequently blood in the stool that was thought to be related to a blood vessel that was burst.  Had seen GI for that evaluation. Currently asymptomatic. She had abdominal cramps that is slowly resolved. She has strong family history of coronary disease with multiple member having atherosclerosis and MI at young age in 45s. She has multiple risk factor including hypertension hyperlipidemia and smoking with  1/2020  Patient is here for follow-up of coronary calcification, hypertension and hyperlipidemia. On follow up patient denies any sob, palpitation or other significant symptoms. She has occasional chest tightness when doing strenuous activity  1/2021  Patient seen today for follow-up. Patient has occasional chest tightness lasting few seconds not related activity exertion. No shortness of breath or edema. 7/2021  Patent seen for 6 month f/u for history of RBBB. She denies chest pain, SOB, palpitations or edema. 1/2022  On follow up patient denies any chest pains,sob, palpitation or other significant symptoms. Review of Systems   Constitutional: Negative for chills and fever. HENT: Negative for nosebleeds. Eyes: Negative for blurred vision and double vision. Respiratory: Negative for cough, hemoptysis, sputum production, shortness of breath and wheezing. Cardiovascular: Negative for chest pain, palpitations, orthopnea, claudication, leg swelling and PND.    Gastrointestinal: Negative for abdominal pain, blood in stool, heartburn, nausea and vomiting. Musculoskeletal: Negative for myalgias. Skin: Negative for rash. Neurological: Negative for dizziness, weakness and headaches. Endo/Heme/Allergies: Does not bruise/bleed easily. Family History   Problem Relation Age of Onset    Heart Attack Father 61    Heart Surgery Brother     Coronary Art Dis Brother 50    Heart Attack Mother        Past Medical History:   Diagnosis Date    Essential hypertension     Hyperlipidemia        Past Surgical History:   Procedure Laterality Date    HX PARTIAL HYSTERECTOMY         Social History     Tobacco Use    Smoking status: Current Every Day Smoker     Packs/day: 1.00     Types: Cigarettes    Smokeless tobacco: Never Used    Tobacco comment: pack a day   Substance Use Topics    Alcohol use: Yes     Comment: 7  beers a week       No Known Allergies    Prior to Admission medications    Medication Sig Start Date End Date Taking? Authorizing Provider   fluticasone propionate (FLONASE) 50 mcg/actuation nasal spray instill 2 sprays into each nostril daily for allergies 10/25/21  Yes Provider, Historical   ascorbic acid, vitamin C, (VITAMIN C) 250 mg tablet Take  by mouth. Yes Provider, Historical   aspirin delayed-release 81 mg tablet Take  by mouth daily. Yes Provider, Historical   simvastatin (ZOCOR) 20 mg tablet Take  by mouth nightly. Yes Provider, Historical   lisinopril (PRINIVIL, ZESTRIL) 40 mg tablet Take 40 mg by mouth daily. Yes Provider, Historical   hydroCHLOROthiazide (HYDRODIURIL) 25 mg tablet Take 25 mg by mouth daily. Yes Provider, Historical         Visit Vitals  /67 (BP 1 Location: Left upper arm, BP Patient Position: Sitting, BP Cuff Size: Adult)   Pulse 88   Ht 5' 4\" (1.626 m)   Wt 68.5 kg (151 lb)   SpO2 98%   BMI 25.92 kg/m²         Physical Exam  Vitals and nursing note reviewed. Constitutional:       Appearance: She is well-developed. HENT:      Head: Normocephalic and atraumatic.    Eyes: Conjunctiva/sclera: Conjunctivae normal.   Neck:      Thyroid: No thyromegaly. Vascular: No JVD. Trachea: No tracheal deviation. Cardiovascular:      Rate and Rhythm: Normal rate and regular rhythm. Chest Wall: PMI is not displaced. Pulses: No decreased pulses. Heart sounds: No murmur heard. No gallop. No S3 sounds. Pulmonary:      Effort: No respiratory distress. Breath sounds: No wheezing or rales. Chest:      Chest wall: No tenderness. Abdominal:      Palpations: Abdomen is soft. Tenderness: There is no abdominal tenderness. Musculoskeletal:      Cervical back: Neck supple. Right lower leg: No edema. Left lower leg: No edema. Skin:     General: Skin is warm. Neurological:      Mental Status: She is alert and oriented to person, place, and time. Ms. Patricia Mendosa has a reminder for a \"due or due soon\" health maintenance. I have asked that she contact her primary care provider for follow-up on this health maintenance. Procedure Conclusion 6/2019    Nuclear Stress Test     Negative myocardial perfusion imaging. Myocardial perfusion imaging supports a low risk stress test.   There is no prior study available for comparison. Interpretation Summary 6/2019       · Left Ventricle: Estimated left ventricular ejection fraction is 66 - 70%. No regional wall motion abnormality noted. Mild (grade 1) left ventricular diastolic dysfunction. · Right Ventricle: Normal right ventricular size and function. · No hemodynamically significant valvular pathology. CARDIOLOGY REPORT:       The patient underwent a limited noncontrast CT scan of the chest with cardiac  gating to evaluate for coronary arterial calcification. Using the Agatston  method the coronary calcium score was calculated. There is moderate coronary  calcium present in lad and rca. Coronary calcium score calculated at   329,lad-110,rca-219.     Impression:  Coronary calcium score 329.   I Have personally reviewed recent relevant labs available and discussed with patient  11/2019lipids  10/2021-lipids  Assessment         ICD-10-CM ICD-9-CM    1. Coronary artery calcification  I25.10 414.00     I25.84 414.4     Stable asymptomatic continue treatment   2. RBBB  I45.10 426.4     Old stable   3. HTN (hypertension), benign  I10 401.1     Stable continue treatment   4. Hyperlipidemia, unspecified hyperlipidemia type  E78.5 272.4     Continue treatment lab with PCP   6/2019  Evaluation for right bundle branch block and symptoms that could be angina equivalent. New right bundle branch block. Will proceed with cardiac work-up including calcium score evaluation in view of strong family history  7/2019  Coronary calcification with negative stress test medical management. 7/2020  Cardiac status stable. Continue statin and aspirin. Lab with PCP  1/2021  Cardiac status stable. Chest tightness stable. Continue medical management. Lab with PCP pending. Elevated blood pressure here will monitor at home and decide on treatment adjustment  7/2021  Cardiac status is stable. Recent BMP CBC and lipids reviewed and discussed with patient blood pressure is controlled continue current medications. 1/2022  Stable cardiac status continue current medical management monitor. Recent LDL 87 I will increase simvastatin to 40 mg  Deirdre Rutherford MD    There are no discontinued medications. No orders of the defined types were placed in this encounter. Follow-up and Dispositions    · Return in about 6 months (around 7/25/2022).

## 2022-03-18 PROBLEM — I25.10 CORONARY ARTERY CALCIFICATION: Status: ACTIVE | Noted: 2019-07-30

## 2022-03-18 PROBLEM — I25.84 CORONARY ARTERY CALCIFICATION: Status: ACTIVE | Noted: 2019-07-30

## 2022-08-24 ENCOUNTER — OFFICE VISIT (OUTPATIENT)
Dept: CARDIOLOGY CLINIC | Age: 67
End: 2022-08-24
Payer: COMMERCIAL

## 2022-08-24 VITALS
BODY MASS INDEX: 25.61 KG/M2 | HEART RATE: 72 BPM | SYSTOLIC BLOOD PRESSURE: 150 MMHG | DIASTOLIC BLOOD PRESSURE: 70 MMHG | WEIGHT: 150 LBS | HEIGHT: 64 IN

## 2022-08-24 DIAGNOSIS — I45.10 RBBB: ICD-10-CM

## 2022-08-24 DIAGNOSIS — E78.5 HYPERLIPIDEMIA, UNSPECIFIED HYPERLIPIDEMIA TYPE: ICD-10-CM

## 2022-08-24 DIAGNOSIS — I25.10 CORONARY ARTERY CALCIFICATION: Primary | ICD-10-CM

## 2022-08-24 DIAGNOSIS — I25.84 CORONARY ARTERY CALCIFICATION: Primary | ICD-10-CM

## 2022-08-24 DIAGNOSIS — I10 HTN (HYPERTENSION), BENIGN: ICD-10-CM

## 2022-08-24 PROCEDURE — 99214 OFFICE O/P EST MOD 30 MIN: CPT | Performed by: INTERNAL MEDICINE

## 2022-08-24 NOTE — PROGRESS NOTES
1. Have you been to the ER, urgent care clinic since your last visit? Hospitalized since your last visit? No    2. Have you seen or consulted any other health care providers outside of the [address] [place] [address] since your last visit? Include any pap smears or colon screening.  No

## 2022-08-24 NOTE — PROGRESS NOTES
HISTORY OF PRESENT ILLNESS  Sofy Waggoner is a 61 y.o. female. 6/ 2019  Chest tightness patient is seen today for new patient evaluation. She had episode where she had profuse sudden sweating and felt like she had to go to the bathroom. She went to the bathroom but did not have any bowel movements. Slowly then episode resolved. No episode of syncope. No dizziness. Denies any chest pain shortness of breath or orthopnea or PND. She also had subsequently blood in the stool that was thought to be related to a blood vessel that was burst.  Had seen GI for that evaluation. Currently asymptomatic. She had abdominal cramps that is slowly resolved. She has strong family history of coronary disease with multiple member having atherosclerosis and MI at young age in 45s. She has multiple risk factor including hypertension hyperlipidemia and smoking with  1/2020  Patient is here for follow-up of coronary calcification, hypertension and hyperlipidemia. On follow up patient denies any sob, palpitation or other significant symptoms. She has occasional chest tightness when doing strenuous activity  1/2021  Patient seen today for follow-up. Patient has occasional chest tightness lasting few seconds not related activity exertion. No shortness of breath or edema. 7/2021  Patent seen for 6 month f/u for history of RBBB. She denies chest pain, SOB, palpitations or edema. 1/2022  On follow up patient denies any chest pains,sob, palpitation or other significant symptoms. Review of Systems   Constitutional:  Negative for chills and fever. HENT:  Negative for nosebleeds. Eyes:  Negative for blurred vision and double vision. Respiratory:  Negative for cough, hemoptysis, sputum production, shortness of breath and wheezing. Cardiovascular:  Negative for chest pain, palpitations, orthopnea, claudication, leg swelling and PND.    Gastrointestinal:  Negative for abdominal pain, blood in stool, heartburn, nausea and vomiting. Musculoskeletal:  Negative for myalgias. Skin:  Negative for rash. Neurological:  Negative for dizziness, weakness and headaches. Endo/Heme/Allergies:  Does not bruise/bleed easily. Family History   Problem Relation Age of Onset    Heart Attack Father 61    Heart Surgery Brother     Coronary Art Dis Brother 50    Heart Attack Mother        Past Medical History:   Diagnosis Date    Essential hypertension     Hyperlipidemia        Past Surgical History:   Procedure Laterality Date    HX PARTIAL HYSTERECTOMY         Social History     Tobacco Use    Smoking status: Every Day     Packs/day: 1.00     Types: Cigarettes    Smokeless tobacco: Never    Tobacco comments:     pack a day   Substance Use Topics    Alcohol use: Yes     Comment: 7  beers a week       No Known Allergies    Prior to Admission medications    Medication Sig Start Date End Date Taking? Authorizing Provider   fluticasone propionate (FLONASE) 50 mcg/actuation nasal spray instill 2 sprays into each nostril daily for allergies 10/25/21  Yes Provider, Historical   simvastatin (ZOCOR) 40 mg tablet Take 1 Tablet by mouth nightly. 1/25/22  Yes Sis Wilson MD   ascorbic acid, vitamin C, (VITAMIN C) 250 mg tablet Take  by mouth. Yes Provider, Historical   aspirin delayed-release 81 mg tablet Take  by mouth daily. Yes Provider, Historical   lisinopril (PRINIVIL, ZESTRIL) 40 mg tablet Take 40 mg by mouth daily. Yes Provider, Historical   hydroCHLOROthiazide (HYDRODIURIL) 25 mg tablet Take 25 mg by mouth daily. Yes Provider, Historical         Visit Vitals  BP (!) 150/70 (BP 1 Location: Left upper arm, BP Patient Position: Sitting, BP Cuff Size: Small adult)   Pulse 72   Ht 5' 4\" (1.626 m)   Wt 68 kg (150 lb)   BMI 25.75 kg/m²         Physical Exam  Vitals and nursing note reviewed. Constitutional:       Appearance: She is well-developed. HENT:      Head: Normocephalic and atraumatic.    Eyes:      Conjunctiva/sclera: Conjunctivae normal.   Neck:      Thyroid: No thyromegaly. Vascular: No JVD. Trachea: No tracheal deviation. Cardiovascular:      Rate and Rhythm: Normal rate and regular rhythm. Chest Wall: PMI is not displaced. Pulses: No decreased pulses. Heart sounds: No murmur heard. No gallop. No S3 sounds. Pulmonary:      Effort: No respiratory distress. Breath sounds: No wheezing or rales. Chest:      Chest wall: No tenderness. Abdominal:      Palpations: Abdomen is soft. Tenderness: There is no abdominal tenderness. Musculoskeletal:      Cervical back: Neck supple. Right lower leg: No edema. Left lower leg: No edema. Skin:     General: Skin is warm. Neurological:      Mental Status: She is alert and oriented to person, place, and time. Ms. Bernice Sever has a reminder for a \"due or due soon\" health maintenance. I have asked that she contact her primary care provider for follow-up on this health maintenance. Procedure Conclusion 6/2019    Nuclear Stress Test     Negative myocardial perfusion imaging. Myocardial perfusion imaging supports a low risk stress test.   There is no prior study available for comparison. Interpretation Summary 6/2019       Left Ventricle: Estimated left ventricular ejection fraction is 66 - 70%. No regional wall motion abnormality noted. Mild (grade 1) left ventricular diastolic dysfunction. Right Ventricle: Normal right ventricular size and function. No hemodynamically significant valvular pathology. CARDIOLOGY REPORT:       The patient underwent a limited noncontrast CT scan of the chest with cardiac  gating to evaluate for coronary arterial calcification. Using the Agatston  method the coronary calcium score was calculated. There is moderate coronary  calcium present in lad and rca. Coronary calcium score calculated at   329,lad-110,rca-219. Impression:  Coronary calcium score 329.   I Have personally reviewed recent relevant labs available and discussed with patient  11/2019-lipids  10/2021-lipids  Assessment         ICD-10-CM ICD-9-CM    1. Coronary artery calcification  I25.10 414.00     I25.84 414.4       2. RBBB  I45.10 426.4       3. HTN (hypertension), benign  I10 401.1       4. Hyperlipidemia, unspecified hyperlipidemia type  E78.5 272.4       5. Family history of coronary arteriosclerosis  Z82.49 V17.3       6/2019  Evaluation for right bundle branch block and symptoms that could be angina equivalent. New right bundle branch block. Will proceed with cardiac work-up including calcium score evaluation in view of strong family history  7/2019  Coronary calcification with negative stress test medical management. 7/2020  Cardiac status stable. Continue statin and aspirin. Lab with PCP  1/2021  Cardiac status stable. Chest tightness stable. Continue medical management. Lab with PCP pending. Elevated blood pressure here will monitor at home and decide on treatment adjustment  7/2021  Cardiac status is stable. Recent BMP CBC and lipids reviewed and discussed with patient blood pressure is controlled continue current medications. 1/2022  Stable cardiac status continue current medical management monitor. Recent LDL 87 I will increase simvastatin to 40 mg  8/2022  Mildly elevated blood pressure. Continue monitoring and decide on medication adjustment if needed. Home monitoring. Recheck labs. Continue current dose of statin. Otherwise stable  Paco Maldonado MD    There are no discontinued medications. No orders of the defined types were placed in this encounter.

## 2022-10-05 LAB
A-G RATIO,AGRAT: 2.4 RATIO (ref 1.1–2.6)
ALBUMIN SERPL-MCNC: 4.7 G/DL (ref 3.5–5)
ALP SERPL-CCNC: 82 U/L (ref 40–120)
ALT SERPL-CCNC: 19 U/L (ref 5–40)
AST SERPL W P-5'-P-CCNC: 21 U/L (ref 10–37)
BILIRUB SERPL-MCNC: 0.6 MG/DL (ref 0.2–1.2)
BILIRUBIN, DIRECT,CBIL: <0.2 MG/DL (ref 0–0.3)
CHOLEST SERPL-MCNC: 151 MG/DL (ref 110–200)
GLOBULIN,GLOB: 2 G/DL (ref 2–4)
HDLC SERPL-MCNC: 3.4 MG/DL (ref 0–5)
HDLC SERPL-MCNC: 45 MG/DL
LDL/HDL RATIO,LDHD: 1.4
LDLC SERPL CALC-MCNC: 65 MG/DL (ref 50–99)
NON-HDL CHOLESTEROL, 011976: 106 MG/DL
PROT SERPL-MCNC: 6.7 G/DL (ref 6.2–8.1)
TRIGL SERPL-MCNC: 207 MG/DL (ref 40–149)
VLDLC SERPL CALC-MCNC: 41 MG/DL (ref 8–30)

## 2022-10-06 ENCOUNTER — TELEPHONE (OUTPATIENT)
Dept: CARDIOLOGY CLINIC | Age: 67
End: 2022-10-06

## 2022-10-06 NOTE — TELEPHONE ENCOUNTER
----- Message from Jonathan Gregg MD sent at 10/6/2022  7:11 AM EDT -----  Increase triglyceride continue with low-carb diet otherwise continue treatment

## 2022-10-06 NOTE — TELEPHONE ENCOUNTER
Called and left message regarding lab/test per Dr. Nicholas Gloria. Lab reviewed. Increase triglyceride continue with low-carb diet otherwise continue treatment. If any questions to call office.

## 2023-03-22 ENCOUNTER — OFFICE VISIT (OUTPATIENT)
Age: 68
End: 2023-03-22
Payer: MEDICARE

## 2023-03-22 VITALS
HEART RATE: 82 BPM | WEIGHT: 148 LBS | DIASTOLIC BLOOD PRESSURE: 71 MMHG | SYSTOLIC BLOOD PRESSURE: 146 MMHG | OXYGEN SATURATION: 95 % | HEIGHT: 64 IN | BODY MASS INDEX: 25.27 KG/M2

## 2023-03-22 DIAGNOSIS — E78.5 HYPERLIPIDEMIA, UNSPECIFIED HYPERLIPIDEMIA TYPE: ICD-10-CM

## 2023-03-22 DIAGNOSIS — I45.10 UNSPECIFIED RIGHT BUNDLE-BRANCH BLOCK: ICD-10-CM

## 2023-03-22 DIAGNOSIS — I10 ESSENTIAL (PRIMARY) HYPERTENSION: ICD-10-CM

## 2023-03-22 DIAGNOSIS — I25.10 ATHEROSCLEROSIS OF NATIVE CORONARY ARTERY OF NATIVE HEART WITHOUT ANGINA PECTORIS: Primary | ICD-10-CM

## 2023-03-22 PROCEDURE — 3077F SYST BP >= 140 MM HG: CPT | Performed by: INTERNAL MEDICINE

## 2023-03-22 PROCEDURE — G8484 FLU IMMUNIZE NO ADMIN: HCPCS | Performed by: INTERNAL MEDICINE

## 2023-03-22 PROCEDURE — 1090F PRES/ABSN URINE INCON ASSESS: CPT | Performed by: INTERNAL MEDICINE

## 2023-03-22 PROCEDURE — 99214 OFFICE O/P EST MOD 30 MIN: CPT | Performed by: INTERNAL MEDICINE

## 2023-03-22 PROCEDURE — G8417 CALC BMI ABV UP PARAM F/U: HCPCS | Performed by: INTERNAL MEDICINE

## 2023-03-22 PROCEDURE — 3017F COLORECTAL CA SCREEN DOC REV: CPT | Performed by: INTERNAL MEDICINE

## 2023-03-22 PROCEDURE — G8400 PT W/DXA NO RESULTS DOC: HCPCS | Performed by: INTERNAL MEDICINE

## 2023-03-22 PROCEDURE — 1123F ACP DISCUSS/DSCN MKR DOCD: CPT | Performed by: INTERNAL MEDICINE

## 2023-03-22 PROCEDURE — G8427 DOCREV CUR MEDS BY ELIG CLIN: HCPCS | Performed by: INTERNAL MEDICINE

## 2023-03-22 PROCEDURE — 3078F DIAST BP <80 MM HG: CPT | Performed by: INTERNAL MEDICINE

## 2023-03-22 PROCEDURE — 4004F PT TOBACCO SCREEN RCVD TLK: CPT | Performed by: INTERNAL MEDICINE

## 2023-03-22 ASSESSMENT — PATIENT HEALTH QUESTIONNAIRE - PHQ9
SUM OF ALL RESPONSES TO PHQ QUESTIONS 1-9: 0
SUM OF ALL RESPONSES TO PHQ QUESTIONS 1-9: 0
DEPRESSION UNABLE TO ASSESS: FUNCTIONAL CAPACITY MOTIVATION LIMITS ACCURACY
SUM OF ALL RESPONSES TO PHQ QUESTIONS 1-9: 0
SUM OF ALL RESPONSES TO PHQ QUESTIONS 1-9: 0
1. LITTLE INTEREST OR PLEASURE IN DOING THINGS: 0
SUM OF ALL RESPONSES TO PHQ9 QUESTIONS 1 & 2: 0
2. FEELING DOWN, DEPRESSED OR HOPELESS: 0

## 2023-03-22 NOTE — PROGRESS NOTES
1. Have you been to the ER, urgent care clinic since your last visit? Hospitalized since your last visit? No    2. Have you seen or consulted any other health care providers outside of the [address] [place] [address] since your last visit? Include any pap smears or colon screening.       No

## 2023-03-22 NOTE — PROGRESS NOTES
HISTORY OF PRESENT ILLNESS  Miranda Trejo is a 61 y.o. female. 6/ 2019  Chest tightness patient is seen today for new patient evaluation. She had episode where she had profuse sudden sweating and felt like she had to go to the bathroom. She went to the bathroom but did not have any bowel movements. Slowly then episode resolved. No episode of syncope. No dizziness. Denies any chest pain shortness of breath or orthopnea or PND. She also had subsequently blood in the stool that was thought to be related to a blood vessel that was burst.  Had seen GI for that evaluation. Currently asymptomatic. She had abdominal cramps that is slowly resolved. She has strong family history of coronary disease with multiple member having atherosclerosis and MI at young age in 45s. She has multiple risk factor including hypertension hyperlipidemia and smoking with  1/2020  Patient is here for follow-up of coronary calcification, hypertension and hyperlipidemia. On follow up patient denies any sob, palpitation or other significant symptoms. She has occasional chest tightness when doing strenuous activity  1/2021  Patient seen today for follow-up. Patient has occasional chest tightness lasting few seconds not related activity exertion. No shortness of breath or edema. 7/2021  Patent seen for 6 month f/u for history of RBBB. She denies chest pain, SOB, palpitations or edema. 1/2022  On follow up patient denies any chest pains,sob, palpitation or other significant symptoms. Review of Systems   Constitutional:  Negative for chills and fever. HENT:  Negative for nosebleeds. Eyes:  Negative for blurred vision and double vision. Respiratory:  Negative for cough, hemoptysis, sputum production, shortness of breath and wheezing. Cardiovascular:  Negative for chest pain, palpitations, orthopnea, claudication, leg swelling and PND.    Gastrointestinal:  Negative for abdominal pain, blood in stool, heartburn,

## 2023-10-03 ENCOUNTER — OFFICE VISIT (OUTPATIENT)
Age: 68
End: 2023-10-03
Payer: MEDICARE

## 2023-10-03 VITALS
DIASTOLIC BLOOD PRESSURE: 64 MMHG | WEIGHT: 140 LBS | SYSTOLIC BLOOD PRESSURE: 130 MMHG | HEIGHT: 64 IN | OXYGEN SATURATION: 96 % | BODY MASS INDEX: 23.9 KG/M2 | HEART RATE: 86 BPM

## 2023-10-03 DIAGNOSIS — E78.5 HYPERLIPIDEMIA, UNSPECIFIED HYPERLIPIDEMIA TYPE: ICD-10-CM

## 2023-10-03 DIAGNOSIS — I25.10 ATHEROSCLEROSIS OF NATIVE CORONARY ARTERY OF NATIVE HEART WITHOUT ANGINA PECTORIS: Primary | ICD-10-CM

## 2023-10-03 DIAGNOSIS — I10 ESSENTIAL (PRIMARY) HYPERTENSION: ICD-10-CM

## 2023-10-03 DIAGNOSIS — Z72.0 TOBACCO ABUSE: ICD-10-CM

## 2023-10-03 PROCEDURE — G8427 DOCREV CUR MEDS BY ELIG CLIN: HCPCS | Performed by: NURSE PRACTITIONER

## 2023-10-03 PROCEDURE — G8420 CALC BMI NORM PARAMETERS: HCPCS | Performed by: NURSE PRACTITIONER

## 2023-10-03 PROCEDURE — 3078F DIAST BP <80 MM HG: CPT | Performed by: NURSE PRACTITIONER

## 2023-10-03 PROCEDURE — 3075F SYST BP GE 130 - 139MM HG: CPT | Performed by: NURSE PRACTITIONER

## 2023-10-03 PROCEDURE — 3017F COLORECTAL CA SCREEN DOC REV: CPT | Performed by: NURSE PRACTITIONER

## 2023-10-03 PROCEDURE — G8400 PT W/DXA NO RESULTS DOC: HCPCS | Performed by: NURSE PRACTITIONER

## 2023-10-03 PROCEDURE — 1090F PRES/ABSN URINE INCON ASSESS: CPT | Performed by: NURSE PRACTITIONER

## 2023-10-03 PROCEDURE — 4004F PT TOBACCO SCREEN RCVD TLK: CPT | Performed by: NURSE PRACTITIONER

## 2023-10-03 PROCEDURE — G8484 FLU IMMUNIZE NO ADMIN: HCPCS | Performed by: NURSE PRACTITIONER

## 2023-10-03 PROCEDURE — 1123F ACP DISCUSS/DSCN MKR DOCD: CPT | Performed by: NURSE PRACTITIONER

## 2023-10-03 PROCEDURE — 99214 OFFICE O/P EST MOD 30 MIN: CPT | Performed by: NURSE PRACTITIONER

## 2023-10-03 ASSESSMENT — PATIENT HEALTH QUESTIONNAIRE - PHQ9
SUM OF ALL RESPONSES TO PHQ QUESTIONS 1-9: 0
1. LITTLE INTEREST OR PLEASURE IN DOING THINGS: 0
SUM OF ALL RESPONSES TO PHQ9 QUESTIONS 1 & 2: 0
DEPRESSION UNABLE TO ASSESS: FUNCTIONAL CAPACITY MOTIVATION LIMITS ACCURACY
2. FEELING DOWN, DEPRESSED OR HOPELESS: 0
SUM OF ALL RESPONSES TO PHQ QUESTIONS 1-9: 0

## 2023-10-03 NOTE — PROGRESS NOTES
1. Have you been to the ER, urgent care clinic since your last visit? Hospitalized since your last visit? No    2. Have you seen or consulted any other health care providers outside of the [address] [place] [address] since your last visit? Include any pap smears or colon screening.       No

## 2023-10-03 NOTE — PROGRESS NOTES
HISTORY OF PRESENT ILLNESS  Jj Mckeon is a 61 y.o. female. [birth year]/ 2017  Chest tightness patient is seen today for new patient evaluation. She had episode where she had profuse sudden sweating and felt like she had to go to the bathroom. She went to the bathroom but did not have any bowel movements. Slowly then episode resolved. No episode of syncope. No dizziness. Denies any chest pain shortness of breath or orthopnea or PND. She also had subsequently blood in the stool that was thought to be related to a blood vessel that was burst.  Had seen GI for that evaluation. Currently asymptomatic. She had abdominal cramps that is slowly resolved. She has strong family history of coronary disease with multiple member having atherosclerosis and MI at young age in 45s. She has multiple risk factor including hypertension hyperlipidemia and smoking with  1/2020  Patient is here for follow-up of coronary calcification, hypertension and hyperlipidemia. On follow up patient denies any sob, palpitation or other significant symptoms. She has occasional chest tightness when doing strenuous activity  1/2021  Patient seen today for follow-up. Patient has occasional chest tightness lasting few seconds not related activity exertion. No shortness of breath or edema. 7/2021  Patent seen for 6 month f/u for history of RBBB. She denies chest pain, SOB, palpitations or edema. 1/2022  On follow up patient denies any chest pains,sob, palpitation or other significant symptoms. Review of Systems   Constitutional:  Negative for chills and fever. HENT:  Negative for nosebleeds. Eyes:  Negative for blurred vision and double vision. Respiratory:  Negative for cough, hemoptysis, sputum production, shortness of breath and wheezing. Cardiovascular:  Negative for chest pain, palpitations, orthopnea, claudication, leg swelling and PND.    Gastrointestinal:  Negative for abdominal pain, blood in stool, heartburn,

## 2024-04-18 ENCOUNTER — OFFICE VISIT (OUTPATIENT)
Age: 69
End: 2024-04-18
Payer: MEDICARE

## 2024-04-18 VITALS
OXYGEN SATURATION: 97 % | DIASTOLIC BLOOD PRESSURE: 63 MMHG | HEIGHT: 64 IN | SYSTOLIC BLOOD PRESSURE: 115 MMHG | BODY MASS INDEX: 23.73 KG/M2 | WEIGHT: 139 LBS | HEART RATE: 93 BPM

## 2024-04-18 DIAGNOSIS — E78.5 HYPERLIPIDEMIA, UNSPECIFIED HYPERLIPIDEMIA TYPE: ICD-10-CM

## 2024-04-18 DIAGNOSIS — I10 ESSENTIAL (PRIMARY) HYPERTENSION: ICD-10-CM

## 2024-04-18 DIAGNOSIS — I25.10 ATHEROSCLEROSIS OF NATIVE CORONARY ARTERY OF NATIVE HEART WITHOUT ANGINA PECTORIS: Primary | ICD-10-CM

## 2024-04-18 DIAGNOSIS — Z72.0 TOBACCO ABUSE: ICD-10-CM

## 2024-04-18 PROCEDURE — 3078F DIAST BP <80 MM HG: CPT | Performed by: NURSE PRACTITIONER

## 2024-04-18 PROCEDURE — 3074F SYST BP LT 130 MM HG: CPT | Performed by: NURSE PRACTITIONER

## 2024-04-18 PROCEDURE — 3017F COLORECTAL CA SCREEN DOC REV: CPT | Performed by: NURSE PRACTITIONER

## 2024-04-18 PROCEDURE — 1090F PRES/ABSN URINE INCON ASSESS: CPT | Performed by: NURSE PRACTITIONER

## 2024-04-18 PROCEDURE — 99214 OFFICE O/P EST MOD 30 MIN: CPT | Performed by: NURSE PRACTITIONER

## 2024-04-18 PROCEDURE — 1123F ACP DISCUSS/DSCN MKR DOCD: CPT | Performed by: NURSE PRACTITIONER

## 2024-04-18 PROCEDURE — G8420 CALC BMI NORM PARAMETERS: HCPCS | Performed by: NURSE PRACTITIONER

## 2024-04-18 PROCEDURE — 4004F PT TOBACCO SCREEN RCVD TLK: CPT | Performed by: NURSE PRACTITIONER

## 2024-04-18 PROCEDURE — G8400 PT W/DXA NO RESULTS DOC: HCPCS | Performed by: NURSE PRACTITIONER

## 2024-04-18 PROCEDURE — G8427 DOCREV CUR MEDS BY ELIG CLIN: HCPCS | Performed by: NURSE PRACTITIONER

## 2024-04-18 ASSESSMENT — PATIENT HEALTH QUESTIONNAIRE - PHQ9
SUM OF ALL RESPONSES TO PHQ QUESTIONS 1-9: 0
2. FEELING DOWN, DEPRESSED OR HOPELESS: NOT AT ALL
DEPRESSION UNABLE TO ASSESS: FUNCTIONAL CAPACITY MOTIVATION LIMITS ACCURACY
SUM OF ALL RESPONSES TO PHQ QUESTIONS 1-9: 0
1. LITTLE INTEREST OR PLEASURE IN DOING THINGS: NOT AT ALL
SUM OF ALL RESPONSES TO PHQ9 QUESTIONS 1 & 2: 0
SUM OF ALL RESPONSES TO PHQ QUESTIONS 1-9: 0
SUM OF ALL RESPONSES TO PHQ QUESTIONS 1-9: 0

## 2024-04-18 NOTE — PROGRESS NOTES
1. Have you been to the ER, urgent care clinic since your last visit?  Hospitalized since your last visit?     No    2. Have you seen or consulted any other health care providers outside of the [facility] since your last visit?  Include any pap smears or colon screening.      No

## 2024-04-18 NOTE — PROGRESS NOTES
HISTORY OF PRESENT ILLNESS  Brianne Marquez is a 66 y.o. female.    6/ 2017  Chest tightness patient is seen today for new patient evaluation.  She had episode where she had profuse sudden sweating and felt like she had to go to the bathroom.  She went to the bathroom but did not have any bowel movements.  Slowly then episode resolved.  No episode of syncope.  No dizziness.  Denies any chest pain shortness of breath or orthopnea or PND.  She also had subsequently blood in the stool that was thought to be related to a blood vessel that was burst.  Had seen GI for that evaluation.  Currently asymptomatic.  She had abdominal cramps that is slowly resolved.  She has strong family history of coronary disease with multiple member having atherosclerosis and MI at young age in 40s.  She has multiple risk factor including hypertension hyperlipidemia and smoking with  1/2020  Patient is here for follow-up of coronary calcification, hypertension and hyperlipidemia.  On follow up patient denies any sob, palpitation or other significant symptoms.  She has occasional chest tightness when doing strenuous activity  1/2021  Patient seen today for follow-up.  Patient has occasional chest tightness lasting few seconds not related activity exertion.  No shortness of breath or edema.  7/2021  Patent seen for 6 month f/u for history of RBBB.  She denies chest pain, SOB, palpitations or edema.  1/2022  On follow up patient denies any chest pains,sob, palpitation or other significant symptoms.  4/18/2024  Patient presents for routine follow-up.  She denies chest pain shortness of breath palpitations or edema.  She reports is changing PCP and has not had lab work drawn in quite some time.  She continues to walk often with new job at [employer].        Review of Systems   Constitutional:  Negative for chills and fever.   HENT:  Negative for nosebleeds.    Eyes:  Negative for blurred vision and double vision.   Respiratory:  Negative

## 2024-05-10 LAB
A/G RATIO: 1.7 RATIO (ref 1.1–2.6)
ALBUMIN: 4.5 G/DL (ref 3.5–5)
ALP BLD-CCNC: 78 U/L (ref 40–120)
ALT SERPL-CCNC: 14 U/L (ref 5–40)
ANION GAP SERPL CALCULATED.3IONS-SCNC: 10 MMOL/L (ref 3–15)
AST SERPL-CCNC: 18 U/L (ref 10–37)
BILIRUB SERPL-MCNC: 0.3 MG/DL (ref 0.2–1.2)
BUN BLDV-MCNC: 12 MG/DL (ref 6–22)
CALCIUM SERPL-MCNC: 10.3 MG/DL (ref 8.4–10.5)
CHLORIDE BLD-SCNC: 100 MMOL/L (ref 98–110)
CHOLESTEROL, TOTAL: 200 MG/DL (ref 110–200)
CHOLESTEROL/HDL RATIO: 3.4 (ref 0–5)
CO2: 31 MMOL/L (ref 20–32)
CREAT SERPL-MCNC: 0.5 MG/DL (ref 0.8–1.4)
GFR, ESTIMATED: >60 ML/MIN/1.73 SQ.M.
GLOBULIN: 2.7 G/DL (ref 2–4)
GLUCOSE: 117 MG/DL (ref 70–99)
HDLC SERPL-MCNC: 59 MG/DL
LDL CHOLESTEROL: 87 MG/DL (ref 50–99)
LDL/HDL RATIO: 1.5
NON-HDL CHOLESTEROL: 141 MG/DL
POTASSIUM SERPL-SCNC: 4.3 MMOL/L (ref 3.5–5.5)
SODIUM BLD-SCNC: 141 MMOL/L (ref 133–145)
TOTAL PROTEIN: 7.2 G/DL (ref 6.2–8.1)
TRIGL SERPL-MCNC: 268 MG/DL (ref 40–149)
VLDLC SERPL CALC-MCNC: 54 MG/DL (ref 8–30)

## 2024-05-14 DIAGNOSIS — I25.10 ATHEROSCLEROSIS OF NATIVE CORONARY ARTERY OF NATIVE HEART WITHOUT ANGINA PECTORIS: ICD-10-CM

## 2024-05-14 DIAGNOSIS — E78.5 HYPERLIPIDEMIA, UNSPECIFIED HYPERLIPIDEMIA TYPE: ICD-10-CM

## 2024-05-14 DIAGNOSIS — I10 ESSENTIAL (PRIMARY) HYPERTENSION: Primary | ICD-10-CM

## 2024-05-14 RX ORDER — ATORVASTATIN CALCIUM 40 MG/1
40 TABLET, FILM COATED ORAL DAILY
Qty: 90 TABLET | Refills: 3 | Status: SHIPPED | OUTPATIENT
Start: 2024-05-14

## 2024-05-14 NOTE — TELEPHONE ENCOUNTER
----- Message from MAKAYLA Hoffman NP sent at 5/14/2024 11:04 AM EDT -----  Please contact the patient and do the following ASAP  Triglyceride LDL elevated from prior.  Recommend to discontinue simvastatin and begin atorvastatin 40 mg/day.  Get fasting lipid profile and LFTs in 6 weeks. Please reinforce diet and exercise.

## 2024-05-14 NOTE — TELEPHONE ENCOUNTER
Spoke to patient per Clay Seth NP regarding lab/test. Lab reviewed.   Please contact the patient and do the following ASAP  Triglyceride LDL elevated from prior.  Recommend to discontinue simvastatin and begin atorvastatin 40 mg/day.  Get fasting lipid profile and LFTs in 6 weeks. Please reinforce diet and exercise. She voices understanding and acceptance of this advice and will call back if any further questions or concerns.

## 2024-05-14 NOTE — TELEPHONE ENCOUNTER
Requested Prescriptions     Pending Prescriptions Disp Refills    atorvastatin (LIPITOR) 40 MG tablet 90 tablet 3     Sig: Take 1 tablet by mouth daily

## 2024-06-29 LAB
A/G RATIO: 1.9 RATIO (ref 1.1–2.6)
ALBUMIN: 4.5 G/DL (ref 3.5–5)
ALP BLD-CCNC: 79 U/L (ref 40–120)
ALT SERPL-CCNC: 13 U/L (ref 5–40)
AST SERPL-CCNC: 18 U/L (ref 10–37)
BILIRUB SERPL-MCNC: 0.7 MG/DL (ref 0.2–1.2)
BILIRUBIN DIRECT: <0.2 MG/DL (ref 0–0.3)
CHOLESTEROL, TOTAL: 152 MG/DL (ref 110–200)
CHOLESTEROL/HDL RATIO: 2.9 (ref 0–5)
GLOBULIN: 2.4 G/DL (ref 2–4)
HDLC SERPL-MCNC: 52 MG/DL
LDL CHOLESTEROL: 70 MG/DL (ref 50–99)
LDL/HDL RATIO: 1.4
NON-HDL CHOLESTEROL: 100 MG/DL
TOTAL PROTEIN: 6.9 G/DL (ref 6.2–8.1)
TRIGL SERPL-MCNC: 148 MG/DL (ref 40–149)
VLDLC SERPL CALC-MCNC: 30 MG/DL (ref 8–30)

## 2024-07-08 ENCOUNTER — TELEPHONE (OUTPATIENT)
Age: 69
End: 2024-07-08

## 2024-07-08 NOTE — TELEPHONE ENCOUNTER
Spoke patient per Clay Seth NP regarding lab/test. Labs reviewed. Please call patient, advise labs are normal.   Follow up as scheduled. She voices understanding and acceptance of this advice and will call back if any further questions or concerns.

## 2024-07-08 NOTE — TELEPHONE ENCOUNTER
----- Message from MAKAYLA Hoffman NP sent at 7/5/2024  2:13 PM EDT -----  Please call patient, advise labs are normal.   Follow up as scheduled

## 2024-10-31 ENCOUNTER — OFFICE VISIT (OUTPATIENT)
Age: 69
End: 2024-10-31

## 2024-10-31 VITALS
HEIGHT: 64 IN | HEART RATE: 84 BPM | DIASTOLIC BLOOD PRESSURE: 77 MMHG | OXYGEN SATURATION: 95 % | SYSTOLIC BLOOD PRESSURE: 160 MMHG | BODY MASS INDEX: 25.1 KG/M2 | WEIGHT: 147 LBS

## 2024-10-31 DIAGNOSIS — E78.5 HYPERLIPIDEMIA, UNSPECIFIED HYPERLIPIDEMIA TYPE: ICD-10-CM

## 2024-10-31 DIAGNOSIS — Z72.0 TOBACCO ABUSE: ICD-10-CM

## 2024-10-31 DIAGNOSIS — I10 ESSENTIAL (PRIMARY) HYPERTENSION: ICD-10-CM

## 2024-10-31 DIAGNOSIS — I25.10 ATHEROSCLEROSIS OF NATIVE CORONARY ARTERY OF NATIVE HEART WITHOUT ANGINA PECTORIS: Primary | ICD-10-CM

## 2024-10-31 ASSESSMENT — PATIENT HEALTH QUESTIONNAIRE - PHQ9
SUM OF ALL RESPONSES TO PHQ QUESTIONS 1-9: 0
1. LITTLE INTEREST OR PLEASURE IN DOING THINGS: NOT AT ALL
2. FEELING DOWN, DEPRESSED OR HOPELESS: NOT AT ALL
SUM OF ALL RESPONSES TO PHQ QUESTIONS 1-9: 0
SUM OF ALL RESPONSES TO PHQ QUESTIONS 1-9: 0
SUM OF ALL RESPONSES TO PHQ9 QUESTIONS 1 & 2: 0
SUM OF ALL RESPONSES TO PHQ QUESTIONS 1-9: 0
DEPRESSION UNABLE TO ASSESS: FUNCTIONAL CAPACITY MOTIVATION LIMITS ACCURACY

## 2024-10-31 NOTE — PROGRESS NOTES
HISTORY OF PRESENT ILLNESS  Brianne Marquez is a 66 y.o. female.    [birth year]/ 2017  Chest tightness patient is seen today for new patient evaluation.  She had episode where she had profuse sudden sweating and felt like she had to go to the bathroom.  She went to the bathroom but did not have any bowel movements.  Slowly then episode resolved.  No episode of syncope.  No dizziness.  Denies any chest pain shortness of breath or orthopnea or PND.  She also had subsequently blood in the stool that was thought to be related to a blood vessel that was burst.  Had seen GI for that evaluation.  Currently asymptomatic.  She had abdominal cramps that is slowly resolved.  She has strong family history of coronary disease with multiple member having atherosclerosis and MI at young age in 40s.  She has multiple risk factor including hypertension hyperlipidemia and smoking with  1/2020  Patient is here for follow-up of coronary calcification, hypertension and hyperlipidemia.  On follow up patient denies any sob, palpitation or other significant symptoms.  She has occasional chest tightness when doing strenuous activity  1/2021  Patient seen today for follow-up.  Patient has occasional chest tightness lasting few seconds not related activity exertion.  No shortness of breath or edema.  7/2021  Patent seen for 6 month f/u for history of RBBB.  She denies chest pain, SOB, palpitations or edema.  1/2022  On follow up patient denies any chest pains,sob, palpitation or other significant symptoms.  4/18/2024  Patient presents for routine follow-up.  She denies chest pain shortness of breath palpitations or edema.  She reports is changing PCP and has not had lab work drawn in quite some time.  She continues to walk often with new job at [employer].  10/31/2024  Seen for routine f/u. Denies chest pain, shortness of breath, palpitations or edema.  Reports anxiety over recent passing of her .        Review of Systems   Constitutional:

## 2024-10-31 NOTE — PATIENT INSTRUCTIONS
After the recommended changes have been made in blood pressure medicines, patient advised to keep BP/HR(pulse rate) chart twice daily and bring us results in next 4 to 5 days. Patient may send the results via \"My Chart\" if desired.  Please rest for 5-10 minutes before checking blood pressure.  Sit on a comfortable chair without crossing the legs and put your arm on a table.  We recommend that you use an upper arm cuff.  Check the blood pressure 3 times each time you check the blood pressure and record the lowest reading.  If you check the blood pressure in both arms, use the higher reading.

## 2024-10-31 NOTE — PROGRESS NOTES
1. Have you been to the ER, urgent care clinic since your last visit?  Hospitalized since your last visit?     No    2. Have you seen or consulted any other health care providers outside of the [facility] since your last visit?  Include any pap smears or colon screening.      No

## 2025-05-29 ENCOUNTER — OFFICE VISIT (OUTPATIENT)
Age: 70
End: 2025-05-29
Payer: MEDICARE

## 2025-05-29 VITALS
DIASTOLIC BLOOD PRESSURE: 71 MMHG | HEART RATE: 91 BPM | WEIGHT: 162 LBS | OXYGEN SATURATION: 96 % | SYSTOLIC BLOOD PRESSURE: 118 MMHG | BODY MASS INDEX: 27.66 KG/M2 | HEIGHT: 64 IN

## 2025-05-29 DIAGNOSIS — E78.5 HYPERLIPIDEMIA, UNSPECIFIED HYPERLIPIDEMIA TYPE: ICD-10-CM

## 2025-05-29 DIAGNOSIS — Z72.0 TOBACCO ABUSE: ICD-10-CM

## 2025-05-29 DIAGNOSIS — I10 ESSENTIAL (PRIMARY) HYPERTENSION: ICD-10-CM

## 2025-05-29 DIAGNOSIS — I25.10 ATHEROSCLEROSIS OF NATIVE CORONARY ARTERY OF NATIVE HEART WITHOUT ANGINA PECTORIS: Primary | ICD-10-CM

## 2025-05-29 PROCEDURE — 4004F PT TOBACCO SCREEN RCVD TLK: CPT | Performed by: NURSE PRACTITIONER

## 2025-05-29 PROCEDURE — 3074F SYST BP LT 130 MM HG: CPT | Performed by: NURSE PRACTITIONER

## 2025-05-29 PROCEDURE — 3017F COLORECTAL CA SCREEN DOC REV: CPT | Performed by: NURSE PRACTITIONER

## 2025-05-29 PROCEDURE — G8428 CUR MEDS NOT DOCUMENT: HCPCS | Performed by: NURSE PRACTITIONER

## 2025-05-29 PROCEDURE — G8400 PT W/DXA NO RESULTS DOC: HCPCS | Performed by: NURSE PRACTITIONER

## 2025-05-29 PROCEDURE — 1123F ACP DISCUSS/DSCN MKR DOCD: CPT | Performed by: NURSE PRACTITIONER

## 2025-05-29 PROCEDURE — 1090F PRES/ABSN URINE INCON ASSESS: CPT | Performed by: NURSE PRACTITIONER

## 2025-05-29 PROCEDURE — G8419 CALC BMI OUT NRM PARAM NOF/U: HCPCS | Performed by: NURSE PRACTITIONER

## 2025-05-29 PROCEDURE — 3078F DIAST BP <80 MM HG: CPT | Performed by: NURSE PRACTITIONER

## 2025-05-29 PROCEDURE — 99214 OFFICE O/P EST MOD 30 MIN: CPT | Performed by: NURSE PRACTITIONER

## 2025-05-29 ASSESSMENT — PATIENT HEALTH QUESTIONNAIRE - PHQ9
SUM OF ALL RESPONSES TO PHQ QUESTIONS 1-9: 0
1. LITTLE INTEREST OR PLEASURE IN DOING THINGS: NOT AT ALL
2. FEELING DOWN, DEPRESSED OR HOPELESS: NOT AT ALL
SUM OF ALL RESPONSES TO PHQ QUESTIONS 1-9: 0

## 2025-05-29 NOTE — PROGRESS NOTES
HISTORY OF PRESENT ILLNESS  Brianne Marquez is a 66 y.o. female.    6/ 2017  Chest tightness patient is seen today for new patient evaluation.  She had episode where she had profuse sudden sweating and felt like she had to go to the bathroom.  She went to the bathroom but did not have any bowel movements.  Slowly then episode resolved.  No episode of syncope.  No dizziness.  Denies any chest pain shortness of breath or orthopnea or PND.  She also had subsequently blood in the stool that was thought to be related to a blood vessel that was burst.  Had seen GI for that evaluation.  Currently asymptomatic.  She had abdominal cramps that is slowly resolved.  She has strong family history of coronary disease with multiple member having atherosclerosis and MI at young age in 40s.  She has multiple risk factor including hypertension hyperlipidemia and smoking with  1/2020  Patient is here for follow-up of coronary calcification, hypertension and hyperlipidemia.  On follow up patient denies any sob, palpitation or other significant symptoms.  She has occasional chest tightness when doing strenuous activity  1/2021  Patient seen today for follow-up.  Patient has occasional chest tightness lasting few seconds not related activity exertion.  No shortness of breath or edema.  7/2021  Patent seen for 6 month f/u for history of RBBB.  She denies chest pain, SOB, palpitations or edema.  1/2022  On follow up patient denies any chest pains,sob, palpitation or other significant symptoms.  4/18/2024  Patient presents for routine follow-up.  She denies chest pain shortness of breath palpitations or edema.  She reports is changing PCP and has not had lab work drawn in quite some time.  She continues to walk often with new job at [employer].  10/31/2024  Seen for routine f/u. Denies chest pain, shortness of breath, palpitations or edema.  Reports anxiety over recent passing of her .  5/29/2025  Seen for routine follow-up

## 2025-05-29 NOTE — PROGRESS NOTES
1. Have you been to the ER, urgent care clinic since your last visit?  Hospitalized since your last visit?     no    2. Have you seen or consulted any other health care providers outside of the [facility] since your last visit?  Include any pap smears or colon screening.       Yes pcp

## 2025-09-04 LAB
A/G RATIO: 1.8 RATIO (ref 1.1–2.6)
ALBUMIN: 4.2 G/DL (ref 3.5–5)
ALP BLD-CCNC: 92 U/L (ref 40–120)
ALT SERPL-CCNC: 16 U/L (ref 5–40)
AST SERPL-CCNC: 20 U/L (ref 10–37)
BILIRUB SERPL-MCNC: 0.4 MG/DL (ref 0.2–1.2)
BILIRUBIN DIRECT: <0.2 MG/DL (ref 0–0.3)
CHOLESTEROL, TOTAL: 130 MG/DL (ref 110–200)
CHOLESTEROL/HDL RATIO: 3 (ref 0–5)
GLOBULIN: 2.3 G/DL (ref 2–4)
HDLC SERPL-MCNC: 44 MG/DL
LDL CHOLESTEROL: 46 MG/DL (ref 50–99)
LDL/HDL RATIO: 1
NON-HDL CHOLESTEROL: 86 MG/DL
TOTAL PROTEIN: 6.5 G/DL (ref 6.2–8.1)
TRIGL SERPL-MCNC: 200 MG/DL (ref 40–149)
VLDLC SERPL CALC-MCNC: 40 MG/DL (ref 8–30)